# Patient Record
Sex: MALE | Race: WHITE | Employment: OTHER | URBAN - METROPOLITAN AREA
[De-identification: names, ages, dates, MRNs, and addresses within clinical notes are randomized per-mention and may not be internally consistent; named-entity substitution may affect disease eponyms.]

---

## 2018-04-11 PROBLEM — E66.01 SEVERE OBESITY (BMI 35.0-39.9) WITH COMORBIDITY (HCC): Status: ACTIVE | Noted: 2018-04-11

## 2018-05-07 ENCOUNTER — APPOINTMENT (OUTPATIENT)
Dept: GENERAL RADIOLOGY | Age: 63
DRG: 308 | End: 2018-05-07
Attending: EMERGENCY MEDICINE
Payer: COMMERCIAL

## 2018-05-07 ENCOUNTER — HOSPITAL ENCOUNTER (INPATIENT)
Age: 63
LOS: 1 days | Discharge: HOME | DRG: 308 | End: 2018-05-09
Attending: EMERGENCY MEDICINE | Admitting: HOSPITALIST
Payer: COMMERCIAL

## 2018-05-07 DIAGNOSIS — J18.9 COMMUNITY ACQUIRED PNEUMONIA OF RIGHT LUNG, UNSPECIFIED PART OF LUNG: ICD-10-CM

## 2018-05-07 DIAGNOSIS — I48.91 ATRIAL FIBRILLATION WITH RVR (HCC): Primary | ICD-10-CM

## 2018-05-07 PROBLEM — D72.829 LEUKOCYTOSIS: Status: ACTIVE | Noted: 2018-05-07

## 2018-05-07 PROBLEM — E87.6 HYPOKALEMIA: Status: ACTIVE | Noted: 2018-05-07

## 2018-05-07 LAB
ALBUMIN SERPL-MCNC: 3.2 G/DL (ref 3.2–4.6)
ALBUMIN/GLOB SERPL: 0.7 {RATIO} (ref 1.2–3.5)
ALP SERPL-CCNC: 61 U/L (ref 50–136)
ALT SERPL-CCNC: 40 U/L (ref 12–65)
ANION GAP SERPL CALC-SCNC: 12 MMOL/L (ref 7–16)
AST SERPL-CCNC: 19 U/L (ref 15–37)
BASOPHILS # BLD: 0 K/UL (ref 0–0.2)
BASOPHILS NFR BLD: 0 % (ref 0–2)
BILIRUB SERPL-MCNC: 0.6 MG/DL (ref 0.2–1.1)
BNP SERPL-MCNC: 211 PG/ML
BUN SERPL-MCNC: 10 MG/DL (ref 8–23)
CALCIUM SERPL-MCNC: 8.4 MG/DL (ref 8.3–10.4)
CHLORIDE SERPL-SCNC: 103 MMOL/L (ref 98–107)
CO2 SERPL-SCNC: 25 MMOL/L (ref 21–32)
CREAT SERPL-MCNC: 1.12 MG/DL (ref 0.8–1.5)
DIFFERENTIAL METHOD BLD: ABNORMAL
EOSINOPHIL # BLD: 0.6 K/UL (ref 0–0.8)
EOSINOPHIL NFR BLD: 4 % (ref 0.5–7.8)
ERYTHROCYTE [DISTWIDTH] IN BLOOD BY AUTOMATED COUNT: 13.5 % (ref 11.9–14.6)
GLOBULIN SER CALC-MCNC: 4.3 G/DL (ref 2.3–3.5)
GLUCOSE SERPL-MCNC: 155 MG/DL (ref 65–100)
HCT VFR BLD AUTO: 45.8 % (ref 41.1–50.3)
HGB BLD-MCNC: 15.6 G/DL (ref 13.6–17.2)
IMM GRANULOCYTES # BLD: 0.1 K/UL (ref 0–0.5)
IMM GRANULOCYTES NFR BLD AUTO: 0 % (ref 0–5)
LACTATE BLD-SCNC: 1.3 MMOL/L (ref 0.5–1.9)
LYMPHOCYTES # BLD: 2.1 K/UL (ref 0.5–4.6)
LYMPHOCYTES NFR BLD: 13 % (ref 13–44)
MAGNESIUM SERPL-MCNC: 2 MG/DL (ref 1.8–2.4)
MCH RBC QN AUTO: 30.1 PG (ref 26.1–32.9)
MCHC RBC AUTO-ENTMCNC: 34.1 G/DL (ref 31.4–35)
MCV RBC AUTO: 88.2 FL (ref 79.6–97.8)
MONOCYTES # BLD: 1.5 K/UL (ref 0.1–1.3)
MONOCYTES NFR BLD: 10 % (ref 4–12)
NEUTS SEG # BLD: 11.4 K/UL (ref 1.7–8.2)
NEUTS SEG NFR BLD: 73 % (ref 43–78)
PLATELET # BLD AUTO: 218 K/UL (ref 150–450)
PMV BLD AUTO: 10.3 FL (ref 10.8–14.1)
POTASSIUM SERPL-SCNC: 3.3 MMOL/L (ref 3.5–5.1)
PROT SERPL-MCNC: 7.5 G/DL (ref 6.3–8.2)
RBC # BLD AUTO: 5.19 M/UL (ref 4.23–5.67)
SODIUM SERPL-SCNC: 140 MMOL/L (ref 136–145)
TROPONIN I BLD-MCNC: 0.02 NG/ML (ref 0.02–0.05)
TSH SERPL DL<=0.005 MIU/L-ACNC: 2.16 UIU/ML (ref 0.36–3.74)
WBC # BLD AUTO: 15.7 K/UL (ref 4.3–11.1)

## 2018-05-07 PROCEDURE — 99285 EMERGENCY DEPT VISIT HI MDM: CPT | Performed by: EMERGENCY MEDICINE

## 2018-05-07 PROCEDURE — 83605 ASSAY OF LACTIC ACID: CPT

## 2018-05-07 PROCEDURE — 96374 THER/PROPH/DIAG INJ IV PUSH: CPT | Performed by: EMERGENCY MEDICINE

## 2018-05-07 PROCEDURE — 84484 ASSAY OF TROPONIN QUANT: CPT

## 2018-05-07 PROCEDURE — 85025 COMPLETE CBC W/AUTO DIFF WBC: CPT | Performed by: EMERGENCY MEDICINE

## 2018-05-07 PROCEDURE — 74011000250 HC RX REV CODE- 250: Performed by: EMERGENCY MEDICINE

## 2018-05-07 PROCEDURE — 74011250636 HC RX REV CODE- 250/636: Performed by: EMERGENCY MEDICINE

## 2018-05-07 PROCEDURE — 80053 COMPREHEN METABOLIC PANEL: CPT | Performed by: EMERGENCY MEDICINE

## 2018-05-07 PROCEDURE — 84145 PROCALCITONIN (PCT): CPT | Performed by: EMERGENCY MEDICINE

## 2018-05-07 PROCEDURE — 83735 ASSAY OF MAGNESIUM: CPT | Performed by: EMERGENCY MEDICINE

## 2018-05-07 PROCEDURE — 93005 ELECTROCARDIOGRAM TRACING: CPT | Performed by: EMERGENCY MEDICINE

## 2018-05-07 PROCEDURE — 83880 ASSAY OF NATRIURETIC PEPTIDE: CPT | Performed by: EMERGENCY MEDICINE

## 2018-05-07 PROCEDURE — 84443 ASSAY THYROID STIM HORMONE: CPT | Performed by: EMERGENCY MEDICINE

## 2018-05-07 PROCEDURE — 74011000258 HC RX REV CODE- 258: Performed by: EMERGENCY MEDICINE

## 2018-05-07 PROCEDURE — 71045 X-RAY EXAM CHEST 1 VIEW: CPT

## 2018-05-07 PROCEDURE — 96361 HYDRATE IV INFUSION ADD-ON: CPT | Performed by: EMERGENCY MEDICINE

## 2018-05-07 RX ORDER — POTASSIUM CHLORIDE 14.9 MG/ML
20 INJECTION INTRAVENOUS
Status: COMPLETED | OUTPATIENT
Start: 2018-05-07 | End: 2018-05-08

## 2018-05-07 RX ORDER — DILTIAZEM HYDROCHLORIDE 5 MG/ML
20 INJECTION INTRAVENOUS
Status: COMPLETED | OUTPATIENT
Start: 2018-05-07 | End: 2018-05-07

## 2018-05-07 RX ORDER — LEVOFLOXACIN 5 MG/ML
750 INJECTION, SOLUTION INTRAVENOUS
Status: COMPLETED | OUTPATIENT
Start: 2018-05-07 | End: 2018-05-08

## 2018-05-07 RX ADMIN — SODIUM CHLORIDE 1000 ML: 900 INJECTION, SOLUTION INTRAVENOUS at 22:28

## 2018-05-07 RX ADMIN — DILTIAZEM HYDROCHLORIDE 20 MG: 5 INJECTION INTRAVENOUS at 22:28

## 2018-05-07 RX ADMIN — LEVOFLOXACIN 750 MG: 5 INJECTION, SOLUTION INTRAVENOUS at 23:36

## 2018-05-07 RX ADMIN — SODIUM CHLORIDE 2.5 MG/HR: 900 INJECTION, SOLUTION INTRAVENOUS at 23:25

## 2018-05-07 NOTE — IP AVS SNAPSHOT
[address] 
[phone] Patient: Tika Galvan MRN: [id number] :1955 About your hospitalization You were admitted on: May 8, 2018 You last received care in the:  [facility] You were discharged on:  May 9, 2018 Why you were hospitalized Your primary diagnosis was:  Atrial Fibrillation With Rvr (Hcc) Your diagnoses also included:  Essential Hypertension, Benign, Mixed Hyperlipidemia, Sleep Apnea, Severe Obesity (Bmi 35.0-39. 9) With Comorbidity (Hcc), Cap (Community Acquired Pneumonia), Leukocytosis, Hypokalemia Follow-up Information Follow up With Details Comments Contact Info Sharon Champagne MD   [address] [place] [address] [address]  Avila Olsen 
[phone] Carter Garcia MD In 2 weeks APPT. MAY 23rd @ 2:[address]  [address] [facility] [address] 
[phone] Your Scheduled Appointments Wednesday May 23, 2018  2:30 PM EDT HOSPITAL FOLLOW-UP with Carter Garcia MD  
[facility] ([facility]) [address]  
[address] [facility] 81  
[phone] Discharge Orders None A check rosanna indicates which time of day the medication should be taken. My Medications START taking these medications Instructions Each Dose to Equal  
 Morning Noon Evening Bedtime  
 azithromycin 500 mg Tab Commonly known as:  [place] William Your next dose is:  TOMORROW Take 1 Tab by mouth daily for 4 days. 500 mg  
    
   
   
   
  
 dilTIAZem  mg ER capsule Commonly known as:  CARDIZEM CD Start taking on:  5/10/2018 Your next dose is:  TOMORROW Take 1 Cap by mouth daily. 240 mg  
    
   
   
   
  
 levoFLOXacin 750 mg tablet Commonly known as:  Alba Bring Your next dose is:  TODAY Take 1 Tab by mouth daily.   
 750 mg  
    
   
   
   
  
  
  
 CONTINUE taking these medications Instructions Each Dose to Equal  
 Morning Noon Evening Bedtime  
 aspirin delayed-release 81 mg tablet Your next dose is:  TOMORROW Take  by mouth daily. DAILY VITAMIN tablet Generic drug:  multivitamin Your next dose is:  TOMORROW Take 1 Tab by mouth daily. 1 Tab FLONASE 50 mcg/actuation nasal spray Generic drug:  fluticasone 2 Sprays as needed for Rhinitis. 2 Spray  
    
   
   
   
  
 lisinopril 20 mg tablet Commonly known as:  Mechele Livings Your next dose is:  TOMORROW  
   
 TAKE 1 TAB BY MOUTH DAILY. LORazepam 1 mg tablet Commonly known as:  ATIVAN Your next dose is:  TODAY Take 1 Tab by mouth nightly. Max Daily Amount: 1 mg.  
 1 mg  
    
   
   
   
  
  
 tadalafil 5 mg tablet Commonly known as:  CIALIS Take 1 Tab by mouth daily. 5 mg ZyrTEC 10 mg tablet Generic drug:  cetirizine Take 10 mg by mouth as needed for Allergies. 10 mg Where to Get Your Medications These medications were sent to [facility]/pharmacy #[id number]- 24 [facility], [address] [place] [address] 59 [address] [facility]  [address] [place] [address], [address] [place] [address] Phone:  [phone]  
  azithromycin 500 mg Tab  
 dilTIAZem  mg ER capsule  
 levoFLOXacin 750 mg tablet Discharge Instructions DISCHARGE SUMMARY from Nurse The following personal items are in your possession at time of discharge: 
 
Dental Appliances: None Visual Aid: None Home Medications: None Jewelry: None Clothing: Footwear, Pants, Shirt Other Valuables: None Personal Items Sent to Safe: none/  \"offered to pt\" / declined PATIENT INSTRUCTIONS: 
 
After general anesthesia or intravenous sedation, for 24 hours or while taking prescription Narcotics: · Limit your activities · Do not drive and operate hazardous machinery · Do not make important personal or business decisions · Do  not drink alcoholic beverages · If you have not urinated within 8 hours after discharge, please contact your surgeon on call. Report the following to your surgeon: 
· Excessive pain, swelling, redness or odor of or around the surgical area · Temperature over 100.5 · Nausea and vomiting lasting longer than 4 hours or if unable to take medications · Any signs of decreased circulation or nerve impairment to extremity: change in color, persistent  numbness, tingling, coldness or increase pain · Any questions What to do at Home: 
Recommended activity: Activity as tolerated, per MD 
 
If you experience any of the following symptoms fever>101, pain unrelieved with medication, nausea/vomiting, shortness of breath, dizziness/fainting, chest pain. , please follow up with your doctor. *  Please give a list of your current medications to your Primary Care Provider. *  Please update this list whenever your medications are discontinued, doses are 
    changed, or new medications (including over-the-counter products) are added. *  Please carry medication information at all times in case of emergency situations. These are general instructions for a healthy lifestyle: No smoking/ No tobacco products/ Avoid exposure to second hand smoke Surgeon General's Warning:  Quitting smoking now greatly reduces serious risk to your health. Obesity, smoking, and sedentary lifestyle greatly increases your risk for illness A healthy diet, regular physical exercise & weight monitoring are important for maintaining a healthy lifestyle You may be retaining fluid if you have a history of heart failure or if you experience any of the following symptoms:  Weight gain of 3 pounds or more overnight or 5 pounds in a week, increased swelling in our hands or feet or shortness of breath while lying flat in bed. Please call your doctor as soon as you notice any of these symptoms; do not wait until your next office visit. Recognize signs and symptoms of STROKE: 
 
F-face looks uneven A-arms unable to move or move unevenly S-speech slurred or non-existent T-time-call 911 as soon as signs and symptoms begin-DO NOT go Back to bed or wait to see if you get better-TIME IS BRAIN. Warning Signs of HEART ATTACK Call 911 if you have these symptoms: 
? Chest discomfort. Most heart attacks involve discomfort in the center of the chest that lasts more than a few minutes, or that goes away and comes back. It can feel like uncomfortable pressure, squeezing, fullness, or pain. ? Discomfort in other areas of the upper body. Symptoms can include pain or discomfort in one or both arms, the back, neck, jaw, or stomach. ? Shortness of breath with or without chest discomfort. ? Other signs may include breaking out in a cold sweat, nausea, or lightheadedness. Don't wait more than five minutes to call [address]! Fast action can save your life. Calling 911 is almost always the fastest way to get lifesaving treatment. Emergency Medical Services staff can begin treatment when they arrive  up to an hour sooner than if someone gets to the hospital by car. The discharge information has been reviewed with the patient. The patient verbalized understanding. Discharge medications reviewed with the patient and appropriate educational materials and side effects teaching were provided. Learning About Atrial Fibrillation What is atrial fibrillation? Atrial fibrillation (say \"AY-tree-baron cxf-awwr-VCN-shun\") is the most common type of irregular heartbeat (arrhythmia). Normally, the heart beats in a strong, steady rhythm.  In atrial fibrillation, a problem with the heart's electrical system causes the two upper parts of the heart (the atria) to quiver, or fibrillate. Your heart rate also may be faster than normal. 
Atrial fibrillation can be dangerous because if the heartbeat isn't strong and steady, blood can collect, or pool, in the atria. And pooled blood is more likely to form clots. Clots can travel to the brain, block blood flow, and cause a stroke. Atrial fibrillation can also lead to heart failure. Treatment for atrial fibrillation helps prevent stroke and heart failure. It also helps relieve symptoms. Atrial fibrillation is often caused by another heart problem. It may happen after heart surgery. It may also be caused by other problems, such as an overactive thyroid gland or lung disease. Many people with atrial fibrillation are able to live full and active lives. What are the symptoms? Some people feel symptoms when they have episodes of atrial fibrillation. But other people don't notice any symptoms. If you have symptoms, you may feel: · A fluttering, racing, or pounding feeling in your chest called palpitations. · Weak or tired. · Dizzy or lightheaded. · Short of breath. · Chest pain. · Confused. You may notice signs of atrial fibrillation when you check your pulse. Your pulse may seem uneven or fast. 
What can you expect when you have atrial fibrillation? At first, spells of atrial fibrillation may come on suddenly and last a short time. It may go away on its own or it goes away after treatment. This is called paroxysmal atrial fibrillation. Over time, the spells may last longer and occur more often. They often don't go away on their own. How is it treated? Treatments can help you feel better and prevent future problems, especially stroke and heart failure. The main types of treatment slow the heart rate, control the heart rhythm, and help prevent stroke. Your treatment will depend on the cause of your atrial fibrillation, your symptoms, and your risk for stroke. · Heart rate treatment. Medicine may be used to slow your heart rate. Your heartbeat may still be irregular. But these medicines keep your heart from beating too fast. They may also help relieve your symptoms. · Heart rhythm treatment. Different treatments may be used to try to stop atrial fibrillation and keep it from returning. They can also relieve symptoms. These treatments include medicine, electrical cardioversion to shock the heart back to a normal rhythm, a procedure called catheter ablation, and heart surgery. · Stroke prevention. You and your doctor can decide how to lower your risk. You may decide to take a blood-thinning medicine, such as aspirin or an anticoagulant. How can you live well with it? You can live well and help manage atrial fibrillation by having a heart-healthy lifestyle. To have a heart-healthy lifestyle: · Don't smoke. · Eat heart-healthy foods. · Be active. Talk to your doctor about what type and level of exercise is safe for you. · Stay at a healthy weight. Lose weight if you need to. · Manage stress. · Avoid alcohol if it triggers symptoms. · Manage other health problems such as high blood pressure, high cholesterol, and diabetes. · Avoid getting sick from the flu. Get a flu shot every year. Where can you learn more? Go to http://аннаClassteacher Learning Systemsstephanie.info/. Enter [phone] in the search box to learn more about \"Learning About Atrial Fibrillation. \" Current as of: September 21, 2016 Content Version: 11.4 © 6757-9789 [employer]. Care instructions adapted under license by [employer] (which disclaims liability or warranty for this information). If you have questions about a medical condition or this instruction, always ask your healthcare professional. [place] [address] any warranty or liability for your use of this information. [employer]) Announcement We are excited to announce that we are making your provider's discharge notes available to you in [place]. You will see these notes when they are completed and signed by the physician that discharged you from your recent hospital stay. If you have any questions or concerns about any information you see in [place], please call the Health Information Department where you were seen or reach out to your Primary Care Provider for more information about your plan of care. Introducing hospitals & HEALTH SERVICES! [facility] introduces [place] patient portal. Now you can access parts of your medical record, email your doctor's office, and request medication refills online. 1. In your internet browser, go to [email or web address] 2. Click on the First Time User? Click Here link in the Sign In box. You will see the New Member Sign Up page. 3. Enter your [place] Access Code exactly as it appears below. You will not need to use this code after youve completed the sign-up process. If you do not sign up before the expiration date, you must request a new code. · [place] Access Code: [email or web address] Expires: 8/5/2018 10:09 PM 
 
4. Enter the last four digits of your Social Security Number (xxxx) and Date of Birth (mm/dd/yyyy) as indicated and click Submit. You will be taken to the next sign-up page. 5. Create a [place] ID. This will be your [place] login ID and cannot be changed, so think of one that is secure and easy to remember. 6. Create a [place] password. You can change your password at any time. 7. Enter your Password Reset Question and Answer. This can be used at a later time if you forget your password. 8. Enter your e-mail address. You will receive e-mail notification when new information is available in [address] 9. Click Sign Up. You can now view and download portions of your medical record.  
10. Click the Download Summary menu link to download a portable copy of your medical information. If you have questions, please visit the Frequently Asked Questions section of the [employer] Corporation[employer] website. Remember, Zite is NOT to be used for urgent needs. For medical emergencies, dial 911. Now available from your iPhone and Android! Introducing Mario Espinoza As a [facility] [employer] [facility] patient, I wanted to make you aware of our electronic visit tool called Mario Espinoza. [employer] allows you to connect within minutes with a medical provider 24 hours a day, seven days a week via a mobile device or tablet or logging into a secure website from your computer. You can access Mario Espinoza from anywhere in the [place]. A virtual visit might be right for you when you have a simple condition and feel like you just dont want to get out of bed, or cant get away from work for an appointment, when your regular [facility] provider is not available (evenings, weekends or holidays), or when youre out of town and need minor care. Electronic visits cost only $49 and if the GrovesBookThatDoc/Planwise provider determines a prescription is needed to treat your condition, one can be electronically transmitted to a nearby pharmacy*. Please take a moment to enroll today if you have not already done so. The enrollment process is free and takes just a few minutes. To enroll, please download the [employer] pineda to your tablet or phone, or visit [email or web address] to enroll on your computer. And, as an [address] [place] [address] patient with a [employer] account, the results of your visits will be scanned into your electronic medical record and your primary care provider will be able to view the scanned results. We urge you to continue to see your regular [facility] provider for your ongoing medical care.   And while your primary care provider may not be the one available when you seek a Mario Espinoza virtual visit, the peace of mind you get from getting a real diagnosis real time can be priceless. For more information on Mario Jeanjose daniel, view our Frequently Asked Questions (FAQs) at [email or web address]. Sincerely, 
 
Dianah Sicard, MD 
Chief Medical Officer Magdalena Landaverde *:  certain medications cannot be prescribed via Mario Espinoza Unresulted Labs-Please follow up with your PCP about these lab tests Order Current Status CULTURE, RESPIRATORY/SPUTUM/BRONCH W GRAM STAIN Preliminary result Providers Seen During Your Hospitalization Provider Specialty Primary office phone Jh Rodriguez MD Emergency Medicine [phone] Lucho Ambrose MD Internal Medicine [phone] Your Primary Care Physician (PCP) Primary Care Physician Office Phone Office Fax Junior Rick [phone] You are allergic to the following Allergen Reactions Penicillins Unknown (comments) Recent Documentation Height Weight BMI Smoking Status 1.727 m 107.5 kg 36.04 kg/m2 Never Smoker Emergency Contacts Name Discharge Info Relation Home Work Mobile Aleyda Raines [5] [phone] Patient Belongings The following personal items are in your possession at time of discharge: 
  Dental Appliances: None  Visual Aid: None      Home Medications: None   Jewelry: None  Clothing: Footwear, Pants, Shirt    Other Valuables: None  Personal Items Sent to Safe: none/  \"offered to pt\" / declined Please provide this summary of care documentation to your next provider. Signatures-by signing, you are acknowledging that this After Visit Summary has been reviewed with you and you have received a copy. Patient Signature:  ____________________________________________________________ Date:  ____________________________________________________________  
  
Reji Ulloa  Provider Signature: ____________________________________________________________ Date:  ____________________________________________________________

## 2018-05-08 LAB
ANION GAP SERPL CALC-SCNC: 10 MMOL/L (ref 7–16)
ATRIAL RATE: 93 BPM
BUN SERPL-MCNC: 8 MG/DL (ref 8–23)
CALCIUM SERPL-MCNC: 7.7 MG/DL (ref 8.3–10.4)
CALCULATED R AXIS, ECG10: -144 DEGREES
CALCULATED T AXIS, ECG11: 56 DEGREES
CHLORIDE SERPL-SCNC: 104 MMOL/L (ref 98–107)
CO2 SERPL-SCNC: 25 MMOL/L (ref 21–32)
CREAT SERPL-MCNC: 1.05 MG/DL (ref 0.8–1.5)
CRP SERPL-MCNC: 14.9 MG/DL (ref 0–0.9)
DIAGNOSIS, 93000: NORMAL
GLUCOSE SERPL-MCNC: 142 MG/DL (ref 65–100)
MAGNESIUM SERPL-MCNC: 1.9 MG/DL (ref 1.8–2.4)
POTASSIUM SERPL-SCNC: 4 MMOL/L (ref 3.5–5.1)
PROCALCITONIN SERPL-MCNC: 0.1 NG/ML
Q-T INTERVAL, ECG07: 332 MS
QRS DURATION, ECG06: 98 MS
QTC CALCULATION (BEZET), ECG08: 515 MS
SODIUM SERPL-SCNC: 139 MMOL/L (ref 136–145)
TROPONIN I SERPL-MCNC: <0.04 NG/ML (ref 0.02–0.05)
TROPONIN I SERPL-MCNC: <0.04 NG/ML (ref 0.02–0.05)
VENTRICULAR RATE, ECG03: 145 BPM

## 2018-05-08 PROCEDURE — 84484 ASSAY OF TROPONIN QUANT: CPT | Performed by: HOSPITALIST

## 2018-05-08 PROCEDURE — 36415 COLL VENOUS BLD VENIPUNCTURE: CPT | Performed by: HOSPITALIST

## 2018-05-08 PROCEDURE — 74011250637 HC RX REV CODE- 250/637: Performed by: PHYSICIAN ASSISTANT

## 2018-05-08 PROCEDURE — 87070 CULTURE OTHR SPECIMN AEROBIC: CPT | Performed by: HOSPITALIST

## 2018-05-08 PROCEDURE — 77030013140 HC MSK NEB VYRM -A

## 2018-05-08 PROCEDURE — 74011000250 HC RX REV CODE- 250: Performed by: PHYSICIAN ASSISTANT

## 2018-05-08 PROCEDURE — 51798 US URINE CAPACITY MEASURE: CPT

## 2018-05-08 PROCEDURE — 74011250636 HC RX REV CODE- 250/636: Performed by: HOSPITALIST

## 2018-05-08 PROCEDURE — 74011000250 HC RX REV CODE- 250: Performed by: EMERGENCY MEDICINE

## 2018-05-08 PROCEDURE — 74011250636 HC RX REV CODE- 250/636: Performed by: INTERNAL MEDICINE

## 2018-05-08 PROCEDURE — 77030020120 HC VLV RESP PEP HI -B

## 2018-05-08 PROCEDURE — 80048 BASIC METABOLIC PNL TOTAL CA: CPT | Performed by: INTERNAL MEDICINE

## 2018-05-08 PROCEDURE — 74011250637 HC RX REV CODE- 250/637: Performed by: INTERNAL MEDICINE

## 2018-05-08 PROCEDURE — 77030027138 HC INCENT SPIROMETER -A

## 2018-05-08 PROCEDURE — 74011250637 HC RX REV CODE- 250/637: Performed by: HOSPITALIST

## 2018-05-08 PROCEDURE — 86140 C-REACTIVE PROTEIN: CPT | Performed by: HOSPITALIST

## 2018-05-08 PROCEDURE — 74011000258 HC RX REV CODE- 258: Performed by: INTERNAL MEDICINE

## 2018-05-08 PROCEDURE — 74011000258 HC RX REV CODE- 258: Performed by: EMERGENCY MEDICINE

## 2018-05-08 PROCEDURE — 74011000250 HC RX REV CODE- 250: Performed by: HOSPITALIST

## 2018-05-08 PROCEDURE — 94640 AIRWAY INHALATION TREATMENT: CPT

## 2018-05-08 PROCEDURE — 83735 ASSAY OF MAGNESIUM: CPT | Performed by: INTERNAL MEDICINE

## 2018-05-08 PROCEDURE — C8929 TTE W OR WO FOL WCON,DOPPLER: HCPCS

## 2018-05-08 PROCEDURE — 74011250636 HC RX REV CODE- 250/636: Performed by: EMERGENCY MEDICINE

## 2018-05-08 PROCEDURE — 65270000029 HC RM PRIVATE

## 2018-05-08 PROCEDURE — BT40ZZZ ULTRASONOGRAPHY OF BLADDER: ICD-10-PCS | Performed by: HOSPITALIST

## 2018-05-08 PROCEDURE — 94760 N-INVAS EAR/PLS OXIMETRY 1: CPT

## 2018-05-08 RX ORDER — AMOXICILLIN 250 MG
2 CAPSULE ORAL
Status: DISCONTINUED | OUTPATIENT
Start: 2018-05-08 | End: 2018-05-09 | Stop reason: HOSPADM

## 2018-05-08 RX ORDER — FLUTICASONE PROPIONATE 50 MCG
2 SPRAY, SUSPENSION (ML) NASAL DAILY
Status: DISCONTINUED | OUTPATIENT
Start: 2018-05-08 | End: 2018-05-09 | Stop reason: HOSPADM

## 2018-05-08 RX ORDER — ASPIRIN 81 MG/1
81 TABLET ORAL DAILY
Status: DISCONTINUED | OUTPATIENT
Start: 2018-05-08 | End: 2018-05-09 | Stop reason: HOSPADM

## 2018-05-08 RX ORDER — AZITHROMYCIN 250 MG/1
500 TABLET, FILM COATED ORAL DAILY
Status: DISCONTINUED | OUTPATIENT
Start: 2018-05-08 | End: 2018-05-09 | Stop reason: HOSPADM

## 2018-05-08 RX ORDER — LORAZEPAM 1 MG/1
1 TABLET ORAL
Status: DISCONTINUED | OUTPATIENT
Start: 2018-05-08 | End: 2018-05-09 | Stop reason: HOSPADM

## 2018-05-08 RX ORDER — SODIUM CHLORIDE 0.9 % (FLUSH) 0.9 %
5-10 SYRINGE (ML) INJECTION EVERY 8 HOURS
Status: DISCONTINUED | OUTPATIENT
Start: 2018-05-08 | End: 2018-05-09 | Stop reason: HOSPADM

## 2018-05-08 RX ORDER — ENOXAPARIN SODIUM 150 MG/ML
110 INJECTION SUBCUTANEOUS EVERY 12 HOURS
Status: DISCONTINUED | OUTPATIENT
Start: 2018-05-08 | End: 2018-05-08

## 2018-05-08 RX ORDER — LEVOFLOXACIN 5 MG/ML
750 INJECTION, SOLUTION INTRAVENOUS EVERY 24 HOURS
Status: DISCONTINUED | OUTPATIENT
Start: 2018-05-08 | End: 2018-05-08

## 2018-05-08 RX ORDER — SODIUM CHLORIDE 0.9 % (FLUSH) 0.9 %
5-10 SYRINGE (ML) INJECTION AS NEEDED
Status: DISCONTINUED | OUTPATIENT
Start: 2018-05-08 | End: 2018-05-09 | Stop reason: HOSPADM

## 2018-05-08 RX ORDER — GUAIFENESIN 100 MG/5ML
81 LIQUID (ML) ORAL DAILY
Status: DISCONTINUED | OUTPATIENT
Start: 2018-05-08 | End: 2018-05-08 | Stop reason: SDUPTHER

## 2018-05-08 RX ORDER — LISINOPRIL 20 MG/1
20 TABLET ORAL DAILY
Status: DISCONTINUED | OUTPATIENT
Start: 2018-05-08 | End: 2018-05-09 | Stop reason: HOSPADM

## 2018-05-08 RX ORDER — ENOXAPARIN SODIUM 100 MG/ML
40 INJECTION SUBCUTANEOUS EVERY 24 HOURS
Status: DISCONTINUED | OUTPATIENT
Start: 2018-05-09 | End: 2018-05-09 | Stop reason: HOSPADM

## 2018-05-08 RX ORDER — BISACODYL 5 MG
5 TABLET, DELAYED RELEASE (ENTERIC COATED) ORAL DAILY PRN
Status: DISCONTINUED | OUTPATIENT
Start: 2018-05-08 | End: 2018-05-09 | Stop reason: HOSPADM

## 2018-05-08 RX ORDER — NALOXONE HYDROCHLORIDE 0.4 MG/ML
0.4 INJECTION, SOLUTION INTRAMUSCULAR; INTRAVENOUS; SUBCUTANEOUS AS NEEDED
Status: DISCONTINUED | OUTPATIENT
Start: 2018-05-08 | End: 2018-05-09 | Stop reason: HOSPADM

## 2018-05-08 RX ORDER — ALBUTEROL SULFATE 0.83 MG/ML
2.5 SOLUTION RESPIRATORY (INHALATION)
Status: DISCONTINUED | OUTPATIENT
Start: 2018-05-08 | End: 2018-05-08

## 2018-05-08 RX ORDER — HYDROCODONE BITARTRATE AND ACETAMINOPHEN 5; 325 MG/1; MG/1
1 TABLET ORAL
Status: DISCONTINUED | OUTPATIENT
Start: 2018-05-08 | End: 2018-05-09 | Stop reason: HOSPADM

## 2018-05-08 RX ORDER — HYDRALAZINE HYDROCHLORIDE 20 MG/ML
20 INJECTION INTRAMUSCULAR; INTRAVENOUS
Status: DISCONTINUED | OUTPATIENT
Start: 2018-05-08 | End: 2018-05-09 | Stop reason: HOSPADM

## 2018-05-08 RX ORDER — DILTIAZEM HYDROCHLORIDE 240 MG/1
240 CAPSULE, COATED, EXTENDED RELEASE ORAL DAILY
Status: DISCONTINUED | OUTPATIENT
Start: 2018-05-08 | End: 2018-05-09 | Stop reason: HOSPADM

## 2018-05-08 RX ORDER — GUAIFENESIN/DEXTROMETHORPHAN 100-10MG/5
10 SYRUP ORAL
Status: DISCONTINUED | OUTPATIENT
Start: 2018-05-08 | End: 2018-05-09 | Stop reason: HOSPADM

## 2018-05-08 RX ORDER — LORATADINE 10 MG/1
10 TABLET ORAL DAILY
Status: DISCONTINUED | OUTPATIENT
Start: 2018-05-08 | End: 2018-05-09 | Stop reason: HOSPADM

## 2018-05-08 RX ORDER — ACETAMINOPHEN 325 MG/1
650 TABLET ORAL
Status: DISCONTINUED | OUTPATIENT
Start: 2018-05-08 | End: 2018-05-09 | Stop reason: HOSPADM

## 2018-05-08 RX ORDER — LEVALBUTEROL INHALATION SOLUTION 1.25 MG/3ML
1.25 SOLUTION RESPIRATORY (INHALATION)
Status: DISCONTINUED | OUTPATIENT
Start: 2018-05-08 | End: 2018-05-09 | Stop reason: HOSPADM

## 2018-05-08 RX ORDER — LEVALBUTEROL INHALATION SOLUTION 1.25 MG/3ML
1.25 SOLUTION RESPIRATORY (INHALATION) ONCE
Status: COMPLETED | OUTPATIENT
Start: 2018-05-08 | End: 2018-05-08

## 2018-05-08 RX ADMIN — LORAZEPAM 1 MG: 1 TABLET ORAL at 02:29

## 2018-05-08 RX ADMIN — Medication 10 ML: at 13:17

## 2018-05-08 RX ADMIN — SODIUM CHLORIDE 8.5 MG/HR: 900 INJECTION, SOLUTION INTRAVENOUS at 09:17

## 2018-05-08 RX ADMIN — DILTIAZEM HYDROCHLORIDE 240 MG: 240 CAPSULE, COATED, EXTENDED RELEASE ORAL at 13:16

## 2018-05-08 RX ADMIN — Medication 10 ML: at 06:00

## 2018-05-08 RX ADMIN — POTASSIUM CHLORIDE 20 MEQ: 200 INJECTION, SOLUTION INTRAVENOUS at 01:52

## 2018-05-08 RX ADMIN — GUAIFENESIN AND DEXTROMETHORPHAN 10 ML: 100; 10 SYRUP ORAL at 04:18

## 2018-05-08 RX ADMIN — PERFLUTREN 1 ML: 6.52 INJECTION, SUSPENSION INTRAVENOUS at 14:04

## 2018-05-08 RX ADMIN — ENOXAPARIN SODIUM 110 MG: 120 INJECTION SUBCUTANEOUS at 15:44

## 2018-05-08 RX ADMIN — LORATADINE 10 MG: 10 TABLET ORAL at 08:54

## 2018-05-08 RX ADMIN — LISINOPRIL 20 MG: 20 TABLET ORAL at 08:53

## 2018-05-08 RX ADMIN — AZITHROMYCIN 500 MG: 250 TABLET, FILM COATED ORAL at 20:23

## 2018-05-08 RX ADMIN — CEFTRIAXONE 1 G: 1 INJECTION, POWDER, FOR SOLUTION INTRAMUSCULAR; INTRAVENOUS at 20:24

## 2018-05-08 RX ADMIN — Medication 10 ML: at 20:31

## 2018-05-08 RX ADMIN — ASPIRIN 81 MG: 81 TABLET, COATED ORAL at 08:54

## 2018-05-08 RX ADMIN — MULTIPLE VITAMINS W/ MINERALS TAB 1 TABLET: TAB at 08:54

## 2018-05-08 RX ADMIN — ENOXAPARIN SODIUM 110 MG: 120 INJECTION SUBCUTANEOUS at 03:02

## 2018-05-08 RX ADMIN — Medication 10 ML: at 02:33

## 2018-05-08 RX ADMIN — Medication 10 ML: at 20:23

## 2018-05-08 RX ADMIN — LEVALBUTEROL HYDROCHLORIDE 1.25 MG: 1.25 SOLUTION RESPIRATORY (INHALATION) at 13:18

## 2018-05-08 NOTE — PROGRESS NOTES
Has not voided, states if he is positioned on side he may be able to. Assisted patient to side in attempt to void.

## 2018-05-08 NOTE — PROGRESS NOTES
Attempted to call report but receiving RN is with another pt.  [place] states she would call back when available

## 2018-05-08 NOTE — ED PROVIDER NOTES
HPI Comments: Patient presents to the ER complaining of fatigue, fevers as well as palpitations. Patient states intermittently for the past couple days he's had fevers and chills. Does report some cough which has been productive of sputum. Denies any vomiting. Reports he is also felt that his heart is racing and skipping beats. Denies any cardiac history. Patient is a 63 y.o. male presenting with palpitations. The history is provided by the patient. Palpitations    This is a new problem. The current episode started more than 2 days ago. The problem has not changed since onset. The problem occurs constantly. Associated symptoms include a fever, malaise/fatigue, irregular heartbeat and cough. Pertinent negatives include no numbness, no chest pain, no exertional chest pressure, no abdominal pain, no nausea, no vomiting, no headaches and no back pain.         Past Medical History:   Diagnosis Date    Allergic rhinitis 3/10/2015    Diverticulosis     Essential hypertension, benign 3/10/2015    Hypertrophy of prostate without urinary obstruction and other lower urinary tract symptoms (LUTS) 3/10/2015    Impotence of organic origin 3/10/2015    Insomnia     Mixed hyperlipidemia     Obesity (BMI 35.0-39.9 without comorbidity)     Sleep apnea 06/2015    CPAP-Dr. Moore [place]       Past Surgical History:   Procedure Laterality Date    HX COLONOSCOPY  05/31/2012    Hyperplastic polyp- Dr. Craig Martinez HX COLONOSCOPY  12/23/2008    colon polyps, diverticulosis and int hemorrhoids    HX COLONOSCOPY  10/18/2017    Dr. Makayla Cline- diverticulosis, int hemorrhoids    HX OTHER SURGICAL  10/1/2005    negative    HX UROLOGICAL  10/2005    negative- Dr. Jef Joyce         Family History:   Problem Relation Age of Onset    Cancer Mother     Cancer Father     Prostate Cancer Father     Cancer Brother     Prostate Cancer Brother     Cancer Other     Asthma Sister        Social History     Social History    Marital status: SINGLE     Spouse name: N/A    Number of children: N/A    Years of education: N/A     Occupational History    Not on file. Social History Main Topics    Smoking status: Never Smoker    Smokeless tobacco: Never Used    Alcohol use Yes    Drug use: No    Sexual activity: Yes     Other Topics Concern    Not on file     Social History Narrative         ALLERGIES: Penicillins    Review of Systems   Constitutional: Positive for chills, fever and malaise/fatigue. HENT: Negative for congestion. Eyes: Negative for photophobia, redness and visual disturbance. Respiratory: Positive for cough. Negative for chest tightness. Cardiovascular: Positive for palpitations. Negative for chest pain. Gastrointestinal: Negative for abdominal pain, nausea and vomiting. Endocrine: Negative for polydipsia, polyphagia and polyuria. Genitourinary: Negative for flank pain, frequency and urgency. Musculoskeletal: Negative for back pain and gait problem. Skin: Negative for pallor and rash. Allergic/Immunologic: Negative for food allergies and immunocompromised state. Neurological: Negative for numbness and headaches. Hematological: Negative for adenopathy. Does not bruise/bleed easily. Psychiatric/Behavioral: Negative for behavioral problems and confusion. All other systems reviewed and are negative. Vitals:    05/07/18 2243 05/07/18 2301 05/07/18 2311 05/07/18 2323   BP:   (!) 159/101 (!) 147/92   Pulse: (!) 107 (!) 103 (!) 110 (!) 138   Resp: 17 19 19 20   Temp:       SpO2: 96% 96% 96% 96%   Weight:       Height:                Physical Exam   Constitutional: He is oriented to person, place, and time. He appears well-developed. HENT:   Head: Normocephalic and atraumatic. Mouth/Throat: Oropharynx is clear and moist.   Eyes: Conjunctivae are normal. Pupils are equal, round, and reactive to light. No scleral icterus. Neck: Normal range of motion. Neck supple.  No tracheal deviation present. No thyromegaly present. Cardiovascular: Normal heart sounds and intact distal pulses. An irregularly irregular rhythm present. Tachycardia present. Pulmonary/Chest: Effort normal and breath sounds normal. No respiratory distress. He has no wheezes. Abdominal: Soft. Bowel sounds are normal. He exhibits no distension. Musculoskeletal: He exhibits no edema. Neurological: He is alert and oriented to person, place, and time. He has normal reflexes. Skin: Skin is warm and dry. No erythema. Nursing note and vitals reviewed. MDM  Number of Diagnoses or Management Options  Diagnosis management comments: Patient appears to be in a A. Fib with RVR  Will give IV fluids, check electrolytes as well as treat with rate slowing agent    Given her reported cough, fevers and chills, concern for concomitant pneumonia. We'll obtain chest x-ray    11:33 PM  White blood cell count is elevated at 15.7. Chemistry panel is normal.  BNP is 211. Normal magnesium  Potassium mildly decreased at 3.3. Patient with initial improvement of HR after Cardizem, however heart rate has become more elevated.     Placed on diltiazem infusion    Discussed case with hospitalist for admission           Amount and/or Complexity of Data Reviewed  Clinical lab tests: ordered and reviewed  Tests in the radiology section of CPT®: ordered and reviewed    Risk of Complications, Morbidity, and/or Mortality  Presenting problems: high  Diagnostic procedures: moderate  Management options: moderate    Patient Progress  Patient progress: improved        ED Course       Procedures

## 2018-05-08 NOTE — PROGRESS NOTES
Admitted to 371 from ED, transferred to bed with minimal difficulty. Alert and oriented X 4. Connected to cardiac monitor which depicts atrial fibrillation, heart rate 140 - 150's. Cardizem drip infusion at 7.5 mg / hour. Denies shortness of breath,and chest pain. Patient does exhibit dry non productive cough. On room air, saturation = 92 %. CHG bath done. Dual skin assessment completed with Melia Page RN. Skin is intact, no redden or broken areas noted. Call bell within reach. Educated on use of call bell, tv controll and bed controls. Instructed to call nursing for any needs.

## 2018-05-08 NOTE — PROGRESS NOTES
05/08/18 1600   Dual Skin Pressure Injury Assessment   Dual Skin Pressure Injury Assessment WDL   Second Care Provider (Based on [address] [place] [address]) Annalise Galvez RN   skin intact and dry.

## 2018-05-08 NOTE — PROGRESS NOTES
Initial visit by  to convey care and concern and encourage patient that  services are available if desired. Offered spiritual interventions during the visit, including prayer as requested. Provided business card for future reference.      Domingo Doss 70  Board Certified

## 2018-05-08 NOTE — PROGRESS NOTES
[facility] Reviewed chart and assumed care of pt. Pt awake and oriented x 4. Denies pain and SOB. Pt in NSR with rate in 90's and increases to 100's with activity. No dyspnea with exertion. See complete assessment as charted.     Lines: peripheral IV  Drips: Cardizem at 8.5 mg/hr  Drains: none

## 2018-05-08 NOTE — PROGRESS NOTES
Hospitalist Progress Note    2018  Admit Date: 2018 10:17 PM   NAME: Author Skyler   :  1955   MRN:  [id number]   Attending: Verner Berry, MD  PCP:  Erlinda Hernandes MD    SUBJECTIVE:   62 y/o gentleman with hx HTN, MALISSA, CPAP, allergic rhinitis who presents with cough and palpitations, found to be in A Fib RVR and also has CAP.    : Feels much better, converted to SR overnight on cardiazem gtt. Denies CP or SOb at this time, afebrile. Nursing notes and chart reviewed. Review of Systems negative with exception of pertinent positives noted above. PHYSICAL EXAM     Visit Vitals    /77 (BP 1 Location: Left arm, BP Patient Position: Sitting)    Pulse 87    Temp 98.9 °F (37.2 °C)    Resp 23    Ht 5' 8\" (1.727 m)    Wt 107.5 kg (237 lb)    SpO2 91%    BMI 36.04 kg/m2      Temp (24hrs), Av.9 °F (37.2 °C), Min:98.4 °F (36.9 °C), Max:100.3 °F (37.9 °C)    Oxygen Therapy  O2 Sat (%): 91 % (18 1603)  Pulse via Oximetry: 93 beats per minute (18 1318)  O2 Device: Room air (18 1318)    Intake/Output Summary (Last 24 hours) at 18 1802  Last data filed at 18 0617   Gross per 24 hour   Intake            234.4 ml   Output              925 ml   Net           -690.6 ml       General: No acute distress. Alert.    Head:  AT/NC    Lungs:  CTABL. Heart:  RRR, no murmur, rub, or gallop  Abdomen: Soft, non-distended, non-tender, +bs  Extremities: No cyanosis or clubbing. Neurologic:  No focal deficits. Moves all extremities. Skin:  No rash  Psych:  Normal mood. LABS AND STUDIES:  Personally reviewed all labs, meds, and studies for past 24hrs. ASSESSMENT      Active Hospital Problems    Diagnosis Date Noted    Atrial fibrillation with RVR ([address] 2018    CAP (community acquired pneumonia) 2018    Leukocytosis 2018    Hypokalemia 2018    Severe obesity (BMI 35.0-39. 9) with comorbidity ([facility] [address] 2018    Mixed hyperlipidemia 06/29/2015    Sleep apnea 06/29/2015    Essential hypertension, benign 03/10/2015           PLAN:    · C/w Cardiazem gtt, cards consulted for further recs regarding A Fib RVR. He doesn't need AC. · Will c/w Rocephin/Azitho, Oxygen and nebs as needed. · Monitor BP  · Replete and monitor K/Mg  · CPAP at night. Dispo: Likely DC In am if feeling better. DVT ppx:  lovenox  Discussed plan with pt who is in agreement. All questions answered.     Signed By: Xenia Solano MD     May 8, 2018

## 2018-05-08 NOTE — ROUTINE PROCESS
TRANSFER - OUT REPORT:    Verbal report given to Mary Kay Friday, RN (name) on Di Lewis  being transferred to [facility](unit) for routine progression of care       Report consisted of patients Situation, Background, Assessment and   Recommendations(SBAR). Information from the following report(s) SBAR was reviewed with the receiving nurse. Lines:   Peripheral IV 05/07/18 Right; Outer Antecubital (Active)   Site Assessment Clean, dry, & intact 5/7/2018 10:43 PM   Phlebitis Assessment 0 5/7/2018 10:43 PM   Infiltration Assessment 0 5/7/2018 10:43 PM   Dressing Status Clean, dry, & intact 5/7/2018 10:43 PM   Hub Color/Line Status Green 5/7/2018 10:43 PM       Peripheral IV 05/08/18 Left Hand (Active)   Site Assessment Clean, dry, & intact 5/8/2018 12:02 AM   Phlebitis Assessment 0 5/8/2018 12:02 AM   Infiltration Assessment 0 5/8/2018 12:02 AM   Dressing Status Clean, dry, & intact 5/8/2018 12:02 AM        Opportunity for questions and clarification was provided.       Patient transported with:   Monitor

## 2018-05-08 NOTE — PROGRESS NOTES
Care Management Interventions  PCP Verified by CM: Yes  Mode of Transport at Discharge: Other (see comment)  Transition of Care Consult (CM Consult): Other  Current Support Network: Own Home  Confirm Follow Up Transport: Family  Plan discussed with Pt/Family/Caregiver: Yes  Freedom of Choice Offered: Yes  Discharge Location  Discharge Placement: Home   Saw pt in interdisciplinarily rounds, plan of care and discharge date/ location discussed. Per the hospitalitis plan is to d/c pt in 24-48hrs to home. Pt works from home, drives and is indep with all ADL's . Has no needs at this time.

## 2018-05-08 NOTE — CONSULTS
[facility] Cardiology Consult                Date of  Admission: 5/7/2018 10:17 PM     Primary Care Physician: Dr. Misbah Farrell  Primary Cardiologist: none  Referring Physician: Dr MADDISON Morrell  Consulting Physician: Dr. Tyra Norris    CC/Reason for consult: a-fib RVR      Jamal Pereira is a 63 y.o. male who presented with fatigue, fever, chills, palpitations, and cough. He was in atrial fibrillation on arrival and was noted to have infiltrate on chest xray. He was admitted and started on IV antibiotics. He has converted to sinus rhythm. He has no prior history of atrial arrhthymias but states he has noted palpitations in the past but this was the first sustained episode. Echo is pending. PMH includes: HTN, MALISSA        Patient Active Problem List   Diagnosis Code    Allergic rhinitis J30.9    Essential hypertension, benign I10    Abnormal blood sugar R73.09    Mixed hyperlipidemia E78.2    Sleep apnea G47.30    Obesity (BMI 35.0-39.9 without comorbidity) E66.9    Insomnia G47.00    Erectile dysfunction N52.9    Elevated PSA R97.20    Severe obesity (BMI 35.0-39. 9) with comorbidity (HCC) E66.01    Atrial fibrillation with RVR ([facility]) I48.91    CAP (community acquired pneumonia) J18.9    Leukocytosis D72.829    Hypokalemia E87.6       Past Medical History:   Diagnosis Date    Allergic rhinitis 3/10/2015    Diverticulosis     Essential hypertension, benign 3/10/2015    Hypertrophy of prostate without urinary obstruction and other lower urinary tract symptoms (LUTS) 3/10/2015    Impotence of organic origin 3/10/2015    Insomnia     Mixed hyperlipidemia     Obesity (BMI 35.0-39.9 without comorbidity)     Sleep apnea 06/2015    CPAP-Dr. Ryan Alcantara      Past Surgical History:   Procedure Laterality Date    HX COLONOSCOPY  05/31/2012    Hyperplastic polyp- Dr. Ang Spence HX COLONOSCOPY  12/23/2008    colon polyps, diverticulosis and int hemorrhoids    HX COLONOSCOPY  10/18/2017    Dr. Betsy Green- diverticulosis, int hemorrhoids    HX OTHER SURGICAL  10/1/2005    negative    HX UROLOGICAL  10/2005    negative- Dr. Endy Shahid     Allergies   Allergen Reactions    Penicillins Unknown (comments)      Family History   Problem Relation Age of Onset    Cancer Mother     Cancer Father     Prostate Cancer Father     Cancer Brother     Prostate Cancer Brother     Cancer Other     Asthma Sister         Current Facility-Administered Medications   Medication Dose Route Frequency    sodium chloride (NS) flush 5-10 mL  5-10 mL IntraVENous Q8H    sodium chloride (NS) flush 5-10 mL  5-10 mL IntraVENous PRN    sodium chloride (NS) flush 5-10 mL  5-10 mL IntraVENous Q8H    sodium chloride (NS) flush 5-10 mL  5-10 mL IntraVENous PRN    levoFLOXacin (LEVAQUIN) 750 mg in D5W IVPB  750 mg IntraVENous Q24H    acetaminophen (TYLENOL) tablet 650 mg  650 mg Oral Q4H PRN    naloxone (NARCAN) injection 0.4 mg  0.4 mg IntraVENous PRN    enoxaparin (LOVENOX) injection 110 mg  110 mg SubCUTAneous Q12H    fluticasone (FLONASE) 50 mcg/actuation nasal spray 2 Spray  2 Spray Both Nostrils DAILY    loratadine (CLARITIN) tablet 10 mg  10 mg Oral DAILY    multivitamin, tx-iron-ca-min (THERA-M w/ IRON) tablet 1 Tab  1 Tab Oral DAILY    aspirin delayed-release tablet 81 mg  81 mg Oral DAILY    LORazepam (ATIVAN) tablet 1 mg  1 mg Oral QHS    lisinopril (PRINIVIL, ZESTRIL) tablet 20 mg  20 mg Oral DAILY    guaiFENesin-dextromethorphan (ROBITUSSIN DM) 100-10 mg/5 mL syrup 10 mL  10 mL Oral Q6H PRN    levalbuterol (XOPENEX) nebulizer soln 1.25 mg/3 mL  1.25 mg Nebulization ONCE    dilTIAZem CD (CARDIZEM CD) capsule 240 mg  240 mg Oral DAILY       Review of Systems   Constitution: Positive for malaise/fatigue. Negative for chills, fever, weakness, weight gain and weight loss. HENT: Negative for ear pain, hearing loss, nosebleeds, sore throat and tinnitus.     Eyes: Negative for blurred vision, vision loss in left eye and vision loss in right eye. Cardiovascular: Positive for palpitations. Negative for chest pain, dyspnea on exertion, leg swelling, near-syncope, orthopnea, paroxysmal nocturnal dyspnea and syncope. Respiratory: Positive for cough, shortness of breath and sputum production. Negative for hemoptysis and wheezing. Endocrine: Negative for cold intolerance, heat intolerance and polydipsia. Hematologic/Lymphatic: Does not bruise/bleed easily. Skin: Negative for color change and rash. Musculoskeletal: Negative for back pain, joint pain, joint swelling and myalgias. Gastrointestinal: Negative for abdominal pain, constipation, diarrhea, dysphagia, heartburn, hematemesis, melena, nausea and vomiting. Genitourinary: Negative for dysuria, frequency, hematuria and urgency. Neurological: Negative for difficulty with concentration, dizziness, headaches, light-headedness, numbness, paresthesias, seizures and vertigo. Psychiatric/Behavioral: Negative for altered mental status and depression.           Physical Exam  Vitals:    05/08/18 0820 05/08/18 0900 05/08/18 1000 05/08/18 1100   BP:  127/73 117/74 125/79   Pulse:  97 85 85   Resp:  20 21 20   Temp:       SpO2: 91% 92% 93% 93%   Weight:       Height:           Physical Exam:  General: Well Developed, Well Nourished, No Acute Distress  HEENT: pupils equal and round, no abnormalities noted  Neck: supple, no JVD  Heart: S1S2 with RRR  Lungs: wheezing bilaterally  Abd: soft, nontender, nondistended, with good bowel sounds  Ext: warm, no edema, calves supple/nontender, pulses 2+ bilaterally  Skin: warm and dry  Psychiatric: Normal mood and affect  Neurologic: Alert and oriented X 3      Cardiographics    Telemetry: sinus rhythm   ECG: a-fib with RVR  Echocardiogram: pending     Labs:   Recent Labs      05/08/18   0524  05/07/18 2228  05/07/18   2226   NA  139   --   140   K  4.0   --   3.3*   MG  1.9   --   2.0   BUN  8   --   10   CREA  1.05   --   1.12   GLU  142*   -- 155*   WBC   --    --   15.7*   HGB   --    --   15.6   HCT   --    --   45.8   PLT   --    --   218   TNIPOC   --   0.02   --    TROIQ  <0.04   --    --      All Cardiac Markers in the last 24 hours:    Lab Results   Component Value Date/Time    TROIQ <0.04 05/08/2018 05:24 AM    TNIPOC 0.02 05/07/2018 10:28 PM         Assessment/Plan:      Principal Problem:     CAP (community acquired pneumonia) (5/7/2018)  On IV antibiotics, wheezing on exam, will give neb treatment ([place] Rod), would avoid albuterol given a-fib RVR on admission      Atrial fibrillation with RVR ([address] (5/7/2018)  Back in sinus rhythm, Echo pending, Chads vasc score of 1, on Lovenox, will change to oral Cardizem, will review echo    Active Problems:    Essential hypertension, benign (3/10/2015)  BP stable on CCB and Lisinopril      Mixed hyperlipidemia (6/29/2015)        Sleep apnea (6/29/2015)  cpap      Severe obesity (BMI 35.0-39. 9) with comorbidity ([address] (4/11/2018)         Leukocytosis (5/7/2018)  Due to CAP      Hypokalemia (5/7/2018)  Resolved         Thank you very much for this referral. We appreciate the opportunity to participate in this patient's care. We will follow along with above stated plan. Lemuel Busby PA-C  Consulting MD: Dr. Lesly Talavera    Patient seen and examined. Agree with above note. He has PAF which converted to SR in setting of  Pneumonia. CHADSVASC2 score 1. Echo pending. No hx of DM or CAD. Currently on po Cardizem. Will continue it for now and F/U in the office in 2 weeks. No need for antiarrhythmic therapy or oral anticoagulation at this time. Will review need for additional meds as OP. Thank you for the consult. Will be on standby.       Michelle Glover MD

## 2018-05-08 NOTE — H&P
Hospitalist H&P/Consult Note     Admit Date:  2018 10:17 PM   Name:  Cherrie Regalado   Age:  63 y.o.  :  1955   MRN:  [id number]   PCP:  Marilu Alvarez MD  Treatment Team: Attending Provider: Shankar Stallworth MD    HPI:   Mr. Dunia Marie is a pleasant 64 y/o gentleman with hx HTN, MALISSA, CPAP, allergic rhinitis who presents with cough and palpitations. He states last Tuesday he saw ENT for aleergy type symptoms and received a kenalog injection. Felt he may have had a \"flu-like illness\" at the time. Since then he has had some progressive dry cough which now has yellow sputum. Some sweats and subjective fever. No chills or confusion. He denies chest pain. Today felt his heart racing and skipping beats. Came to ED and is found to have rapid atrial fibrillation which is new for him. No hx MI. No hx DVT o, PE or thyroid disease. Chest xray shows right perihilar infiltrate and he has a WBC ct of 15.7. Started on Cardizem GTT for rate control and levaquin for CAP. Hospitalist service is consulted for admission. 10 systems reviewed and negative except as noted in HPI.   Past Medical History:   Diagnosis Date    Allergic rhinitis 3/10/2015    Diverticulosis     Essential hypertension, benign 3/10/2015    Hypertrophy of prostate without urinary obstruction and other lower urinary tract symptoms (LUTS) 3/10/2015    Impotence of organic origin 3/10/2015    Insomnia     Mixed hyperlipidemia     Obesity (BMI 35.0-39.9 without comorbidity)     Sleep apnea 2015    CPAP-Dr. Zita Sadler      Past Surgical History:   Procedure Laterality Date    HX COLONOSCOPY  2012    Hyperplastic polyp- Dr. Maricarmen Ledezma HX COLONOSCOPY  2008    colon polyps, diverticulosis and int hemorrhoids    HX COLONOSCOPY  10/18/2017    Dr. Kaden John- diverticulosis, int hemorrhoids    HX OTHER SURGICAL  10/1/2005    negative    HX UROLOGICAL  10/2005    negative- Dr. Mabel Vnies      Prior to Admission Medications Prescriptions Last Dose Informant Patient Reported? Taking? LORazepam (ATIVAN) 1 mg tablet 2018  No No   Sig: Take 1 Tab by mouth nightly. Max Daily Amount: 1 mg.   aspirin delayed-release 81 mg tablet 2018 at 0800  Yes No   Sig: Take  by mouth daily. cetirizine (ZYRTEC) 10 mg tablet Unknown at Unknown time  Yes No   Sig: Take 10 mg by mouth as needed for Allergies. fluticasone (FLONASE) 50 mcg/actuation nasal spray 2018 at 1700  Yes No   Si Sprays as needed for Rhinitis. lisinopril (PRINIVIL, ZESTRIL) 20 mg tablet 2018 at 0800  No No   Sig: TAKE 1 TAB BY MOUTH DAILY. multivitamin (DAILY VITAMIN) tablet Unknown at Unknown time  Yes No   Sig: Take 1 Tab by mouth daily. tadalafil (CIALIS) 5 mg tablet 2018 at Unknown time  No Yes   Sig: Take 1 Tab by mouth daily.       Facility-Administered Medications: None     Allergies   Allergen Reactions    Penicillins Unknown (comments)      Social History   Substance Use Topics    Smoking status: Never Smoker    Smokeless tobacco: Never Used    Alcohol use Yes      Family History   Problem Relation Age of Onset    Cancer Mother     Cancer Father     Prostate Cancer Father     Cancer Brother     Prostate Cancer Brother     Cancer Other     Asthma Sister       Immunization History   Administered Date(s) Administered    Influenza Vaccine 10/24/2012, 10/30/2013    Influenza Vaccine PF 10/16/2015    Td 09/15/2005       Objective:   Patient Vitals for the past 24 hrs:   Temp Pulse Resp BP SpO2   18 0157 - 86 21 119/78 94 %   18 0152 - (!) 137 20 130/84 92 %   18 0147 98.4 °F (36.9 °C) (!) 133 18 156/89 92 %   18 0112 - (!) 129 18 143/81 96 %   18 0102 - (!) 120 19 - 96 %   18 0042 - (!) 113 19 121/90 96 %   18 0003 - (!) 130 20 - 96 %   18 2357 - (!) 130 20 - 96 %   18 2342 - (!) 116 25 (!) 147/95 96 %   18 2323 - (!) 138 20 (!) 147/92 96 %   18 2311 - (!) 110 19 (!) 159/101 96 %   05/07/18 2301 - (!) 103 19 - 96 %   05/07/18 2243 - (!) 107 17 - 96 %   05/07/18 2236 - (!) 125 23 - 97 %   05/07/18 2235 - (!) 128 17 - 93 %   05/07/18 2233 - (!) 110 16 - 92 %   05/07/18 2229 - (!) 145 22 - 94 %   05/07/18 2224 - (!) 155 18 - 95 %   05/07/18 2223 - (!) 147 16 (!) 151/97 90 %   05/07/18 2209 98.4 °F (36.9 °C) (!) 133 18 (!) 138/97 95 %     Oxygen Therapy  O2 Sat (%): 94 % (05/08/18 0157)  Pulse via Oximetry: 87 beats per minute (05/08/18 0157)  O2 Device: Room air (05/07/18 2209)  No intake or output data in the 24 hours ending 05/08/18 0217    Physical Exam:  General:    Well nourished. Alert. Warm, sweaty   Eyes:   Normal sclera. Extraocular movements intact. ENT:  Normocephalic, atraumatic. Moist mucous membranes  CV:   Irregularly, irregular  Lungs:  Bilateral wheeze, rales on the right  Abdomen: Soft, nontender, nondistended. Bowel sounds normal.   Extremities: Warm and dry. No cyanosis or edema. Distal pulses palpable, no mottling, no calf tenderness  Neurologic: CN II-XII grossly intact. Sensation intact. Skin:     No rashes or jaundice. No wounds. Psych:  Normal mood and affect. I reviewed the labs, imaging, EKGs, telemetry, and other studies done this admission.   Data Review:   Recent Results (from the past 24 hour(s))   EKG, 12 LEAD, INITIAL    Collection Time: 05/07/18 10:14 PM   Result Value Ref Range    Ventricular Rate 145 BPM    Atrial Rate 93 BPM    QRS Duration 98 ms    Q-T Interval 332 ms    QTC Calculation (Bezet) 515 ms    Calculated R Axis -144 degrees    Calculated T Axis 56 degrees    Diagnosis       Atrial fibrillation with rapid ventricular response with premature   ventricular or aberrantly conducted complexes  Right superior axis deviation  Incomplete right bundle branch block  Anterior infarct , age undetermined  Abnormal ECG  No previous ECGs available     CBC WITH AUTOMATED DIFF    Collection Time: 05/07/18 10:26 PM   Result Value Ref Range    WBC 15.7 (H) 4.3 - 11.1 K/uL    RBC 5.19 4.23 - 5.67 M/uL    HGB 15.6 13.6 - 17.2 g/dL    HCT 45.8 41.1 - 50.3 %    MCV 88.2 79.6 - 97.8 FL    MCH 30.1 26.1 - 32.9 PG    MCHC 34.1 31.4 - 35.0 g/dL    RDW 13.5 11.9 - 14.6 %    PLATELET 491 260 - 912 K/uL    MPV 10.3 (L) 10.8 - 14.1 FL    DF AUTOMATED      NEUTROPHILS 73 43 - 78 %    LYMPHOCYTES 13 13 - 44 %    MONOCYTES 10 4.0 - 12.0 %    EOSINOPHILS 4 0.5 - 7.8 %    BASOPHILS 0 0.0 - 2.0 %    IMMATURE GRANULOCYTES 0 0.0 - 5.0 %    ABS. NEUTROPHILS 11.4 (H) 1.7 - 8.2 K/UL    ABS. LYMPHOCYTES 2.1 0.5 - 4.6 K/UL    ABS. MONOCYTES 1.5 (H) 0.1 - 1.3 K/UL    ABS. EOSINOPHILS 0.6 0.0 - 0.8 K/UL    ABS. BASOPHILS 0.0 0.0 - 0.2 K/UL    ABS. IMM. GRANS. 0.1 0.0 - 0.5 K/UL   METABOLIC PANEL, COMPREHENSIVE    Collection Time: 05/07/18 10:26 PM   Result Value Ref Range    Sodium 140 136 - 145 mmol/L    Potassium 3.3 (L) 3.5 - 5.1 mmol/L    Chloride 103 98 - 107 mmol/L    CO2 25 21 - 32 mmol/L    Anion gap 12 7 - 16 mmol/L    Glucose 155 (H) 65 - 100 mg/dL    BUN 10 8 - 23 MG/DL    Creatinine 1.12 0.8 - 1.5 MG/DL    GFR est AA >60 >60 ml/min/1.73m2    GFR est non-AA >60 >60 ml/min/1.73m2    Calcium 8.4 8.3 - 10.4 MG/DL    Bilirubin, total 0.6 0.2 - 1.1 MG/DL    ALT (SGPT) 40 12 - 65 U/L    AST (SGOT) 19 15 - 37 U/L    Alk.  phosphatase 61 50 - 136 U/L    Protein, total 7.5 6.3 - 8.2 g/dL    Albumin 3.2 3.2 - 4.6 g/dL    Globulin 4.3 (H) 2.3 - 3.5 g/dL    A-G Ratio 0.7 (L) 1.2 - 3.5     BNP    Collection Time: 05/07/18 10:26 PM   Result Value Ref Range     pg/mL   MAGNESIUM    Collection Time: 05/07/18 10:26 PM   Result Value Ref Range    Magnesium 2.0 1.8 - 2.4 mg/dL   TSH 3RD GENERATION    Collection Time: 05/07/18 10:26 PM   Result Value Ref Range    TSH 2.156 0.358 - 3.740 uIU/mL   PROCALCITONIN    Collection Time: 05/07/18 10:26 PM   Result Value Ref Range    Procalcitonin 0.1 ng/mL   POC TROPONIN-I    Collection Time: 05/07/18 10:28 PM   Result Value Ref Range Troponin-I (POC) 0.02 0.02 - 0.05 ng/ml   POC LACTIC ACID    Collection Time: 05/07/18 10:30 PM   Result Value Ref Range    Lactic Acid (POC) 1.3 0.5 - 1.9 mmol/L       Imaging Studies:  CXR Results  (Last 48 hours)               05/07/18 2236  XR CHEST PORT Final result    Impression:  IMPRESSION: Right perihilar density, likely infiltrate           Narrative:  Chest X-ray       INDICATION:   Shortness of breath, tachycardia       A portable AP view of the chest was obtained. FINDINGS: There is minimal right perihilar infiltrate. Left lung is clear. There is mild prominence of the heart. The bony thorax is intact. CT Results  (Last 48 hours)    None          Assessment and Plan:     Hospital Problems as of 5/8/2018  Date Reviewed: 4/11/2018          Codes Class Noted - Resolved POA    * (Principal)Atrial fibrillation with RVR (HCC) ICD-10-CM: I48.91  ICD-9-CM: 427.31  5/7/2018 - Present Yes        CAP (community acquired pneumonia) ICD-10-CM: J18.9  ICD-9-CM: 741  5/7/2018 - Present Yes        Leukocytosis ICD-10-CM: B23.131  ICD-9-CM: 288.60  5/7/2018 - Present Yes        Hypokalemia ICD-10-CM: E87.6  ICD-9-CM: 276.8  5/7/2018 - Present Yes        Severe obesity (BMI 35.0-39. 9) with comorbidity ([facility] [address] ICD-10-CM: E66.01  ICD-9-CM: 278.01  4/11/2018 - Present Yes        Mixed hyperlipidemia ICD-10-CM: E78.2  ICD-9-CM: 272.2  6/29/2015 - Present Yes        Sleep apnea ICD-10-CM: G47.30  ICD-9-CM: 780.57  6/29/2015 - Present Yes        Essential hypertension, benign ICD-10-CM: I10  ICD-9-CM: 401.1  3/10/2015 - Present Yes              PLAN:  · Admit to telemetry, bedrest tonight  · Continue IV cardizem infusion for BP and rate control  · Anticoagulate with weight-based lovenox 1 mg/kg SQ Q 12 hours  · Serial troponin to r/o ischemia/ACS  · Keep potassium above 4, Mg above 2. TSH is normal  · Continue asa and lipid control  · Continue lovenox for pneumonia. · Mucolytic.  Send sputum for culture  · Echocardiogram in am  · If patient does not spontaneously convert to NSR consult Cardiology in am  · Prn respiratory treatments      Estimated LOS:  Greater than 2 midnights    Signed:  Leonie Rey MD

## 2018-05-08 NOTE — PROGRESS NOTES
Problem: Falls - Risk of  Goal: *Absence of Falls  Document Akash Fall Risk and appropriate interventions in the flowsheet.    Outcome: Progressing Towards Goal  Fall Risk Interventions:  Mobility Interventions: Patient to call before getting OOB         Medication Interventions: Patient to call before getting OOB

## 2018-05-08 NOTE — PROGRESS NOTES
Report to José Aquino RN.  Patient awakens to verbal. Cardizem drip remains at 8.5 mg/hr, heart rate 95/ NSR.

## 2018-05-08 NOTE — PROGRESS NOTES
TRANSFER - OUT REPORT:    Verbal report given to GABE Ramirez(name) on Wm. Patrizia Fink  being transferred to [facility](unit) for routine progression of care       Report consisted of patients Situation, Background, Assessment and   Recommendations(SBAR). Information from the following report(s) SBAR, Kardex, Intake/Output, MAR, Recent Results and Cardiac Rhythm NSR was reviewed with the receiving nurse. Lines:   Peripheral IV 05/07/18 Right; Outer Antecubital (Active)   Site Assessment Clean, dry, & intact 5/8/2018  7:00 AM   Phlebitis Assessment 0 5/8/2018  7:00 AM   Infiltration Assessment 0 5/8/2018  7:00 AM   Dressing Status Clean, dry, & intact 5/8/2018  7:00 AM   Dressing Type Transparent 5/8/2018  7:00 AM   Hub Color/Line Status Infusing 5/8/2018  7:00 AM   Alcohol Cap Used No 5/8/2018  1:47 AM       Peripheral IV 05/08/18 Left Hand (Active)   Site Assessment Clean, dry, & intact 5/8/2018  7:00 AM   Phlebitis Assessment 0 5/8/2018  7:00 AM   Infiltration Assessment 0 5/8/2018  7:00 AM   Dressing Status Clean, dry, & intact 5/8/2018  7:00 AM   Dressing Type Transparent;Tape 5/8/2018  7:00 AM   Hub Color/Line Status Capped 5/8/2018  7:00 AM   Alcohol Cap Used No 5/8/2018  1:47 AM        Opportunity for questions and clarification was provided.

## 2018-05-08 NOTE — PROGRESS NOTES
TRANSFER - IN REPORT:    Verbal report received from Luis Felipe Barahona on [employer]  being received from ED for routine progression of care      Report consisted of patients Situation, Background, Assessment and   Recommendations(SBAR). Information from the following report(s) SBAR, ED Summary, [facility] and Cardiac Rhythm atrial fibrillation was reviewed with the receiving nurse. Opportunity for questions and clarification was provided. Assessment completed upon patients arrival to unit and care assumed.

## 2018-05-08 NOTE — PROGRESS NOTES
Shift assessment complete. Respirations present. Even and unlabored. No s/s of distress. Zero c/o pain at this time. Call light within reach. Encouraged patient to call for assistance. Patient verbalizes understanding. See Doc Flowsheet for assessment details. Patient resting in bed. Received from ICU . Some congestion heard when coughing. Denies any pain , or shortness of breath. Ambulating to bathroom to void at intervals. Skin intact, checked with Hernando Parekh RN. Room air noted. Bowel movement yesterday.

## 2018-05-09 VITALS
HEART RATE: 75 BPM | HEIGHT: 68 IN | BODY MASS INDEX: 35.92 KG/M2 | OXYGEN SATURATION: 94 % | RESPIRATION RATE: 18 BRPM | DIASTOLIC BLOOD PRESSURE: 76 MMHG | WEIGHT: 237 LBS | TEMPERATURE: 98.2 F | SYSTOLIC BLOOD PRESSURE: 119 MMHG

## 2018-05-09 LAB
ALBUMIN SERPL-MCNC: 2.5 G/DL (ref 3.2–4.6)
ALBUMIN/GLOB SERPL: 0.7 {RATIO} (ref 1.2–3.5)
ALP SERPL-CCNC: 43 U/L (ref 50–136)
ALT SERPL-CCNC: 41 U/L (ref 12–65)
ANION GAP SERPL CALC-SCNC: 8 MMOL/L (ref 7–16)
AST SERPL-CCNC: 18 U/L (ref 15–37)
BASOPHILS # BLD: 0.1 K/UL (ref 0–0.2)
BASOPHILS NFR BLD: 1 % (ref 0–2)
BILIRUB SERPL-MCNC: 0.3 MG/DL (ref 0.2–1.1)
BUN SERPL-MCNC: 8 MG/DL (ref 8–23)
CALCIUM SERPL-MCNC: 8 MG/DL (ref 8.3–10.4)
CHLORIDE SERPL-SCNC: 106 MMOL/L (ref 98–107)
CO2 SERPL-SCNC: 26 MMOL/L (ref 21–32)
CREAT SERPL-MCNC: 0.91 MG/DL (ref 0.8–1.5)
DIFFERENTIAL METHOD BLD: ABNORMAL
EOSINOPHIL # BLD: 0.6 K/UL (ref 0–0.8)
EOSINOPHIL NFR BLD: 8 % (ref 0.5–7.8)
ERYTHROCYTE [DISTWIDTH] IN BLOOD BY AUTOMATED COUNT: 13.7 % (ref 11.9–14.6)
GLOBULIN SER CALC-MCNC: 3.8 G/DL (ref 2.3–3.5)
GLUCOSE SERPL-MCNC: 113 MG/DL (ref 65–100)
HCT VFR BLD AUTO: 39.9 % (ref 41.1–50.3)
HGB BLD-MCNC: 13.3 G/DL (ref 13.6–17.2)
IMM GRANULOCYTES # BLD: 0.1 K/UL (ref 0–0.5)
IMM GRANULOCYTES NFR BLD AUTO: 2 % (ref 0–5)
LYMPHOCYTES # BLD: 1.9 K/UL (ref 0.5–4.6)
LYMPHOCYTES NFR BLD: 22 % (ref 13–44)
MCH RBC QN AUTO: 29.4 PG (ref 26.1–32.9)
MCHC RBC AUTO-ENTMCNC: 33.3 G/DL (ref 31.4–35)
MCV RBC AUTO: 88.3 FL (ref 79.6–97.8)
MONOCYTES # BLD: 1.1 K/UL (ref 0.1–1.3)
MONOCYTES NFR BLD: 13 % (ref 4–12)
NEUTS SEG # BLD: 4.7 K/UL (ref 1.7–8.2)
NEUTS SEG NFR BLD: 54 % (ref 43–78)
PLATELET # BLD AUTO: 232 K/UL (ref 150–450)
PMV BLD AUTO: 9.6 FL (ref 10.8–14.1)
POTASSIUM SERPL-SCNC: 3.9 MMOL/L (ref 3.5–5.1)
PROT SERPL-MCNC: 6.3 G/DL (ref 6.3–8.2)
RBC # BLD AUTO: 4.52 M/UL (ref 4.23–5.67)
SODIUM SERPL-SCNC: 140 MMOL/L (ref 136–145)
WBC # BLD AUTO: 8.6 K/UL (ref 4.3–11.1)

## 2018-05-09 PROCEDURE — 74011250637 HC RX REV CODE- 250/637: Performed by: PHYSICIAN ASSISTANT

## 2018-05-09 PROCEDURE — 74011250637 HC RX REV CODE- 250/637: Performed by: HOSPITALIST

## 2018-05-09 PROCEDURE — 85025 COMPLETE CBC W/AUTO DIFF WBC: CPT | Performed by: HOSPITALIST

## 2018-05-09 PROCEDURE — 80053 COMPREHEN METABOLIC PANEL: CPT | Performed by: HOSPITALIST

## 2018-05-09 PROCEDURE — 74011250636 HC RX REV CODE- 250/636: Performed by: INTERNAL MEDICINE

## 2018-05-09 PROCEDURE — 94760 N-INVAS EAR/PLS OXIMETRY 1: CPT

## 2018-05-09 PROCEDURE — 74011250637 HC RX REV CODE- 250/637: Performed by: INTERNAL MEDICINE

## 2018-05-09 PROCEDURE — 36415 COLL VENOUS BLD VENIPUNCTURE: CPT | Performed by: HOSPITALIST

## 2018-05-09 PROCEDURE — 77010033678 HC OXYGEN DAILY

## 2018-05-09 RX ORDER — LEVOFLOXACIN 750 MG/1
750 TABLET ORAL DAILY
Qty: 5 TAB | Refills: 0 | Status: SHIPPED | OUTPATIENT
Start: 2018-05-09 | End: 2018-05-14

## 2018-05-09 RX ORDER — DILTIAZEM HYDROCHLORIDE 240 MG/1
240 CAPSULE, COATED, EXTENDED RELEASE ORAL DAILY
Qty: 30 CAP | Refills: 0 | Status: SHIPPED | OUTPATIENT
Start: 2018-05-10 | End: 2018-06-07 | Stop reason: SDUPTHER

## 2018-05-09 RX ORDER — AZITHROMYCIN 500 MG/1
500 TABLET, FILM COATED ORAL DAILY
Qty: 4 TAB | Refills: 0 | Status: SHIPPED | OUTPATIENT
Start: 2018-05-09 | End: 2018-05-13

## 2018-05-09 RX ADMIN — FLUTICASONE PROPIONATE 2 SPRAY: 50 SPRAY, METERED NASAL at 09:44

## 2018-05-09 RX ADMIN — MULTIPLE VITAMINS W/ MINERALS TAB 1 TABLET: TAB at 09:37

## 2018-05-09 RX ADMIN — ENOXAPARIN SODIUM 40 MG: 40 INJECTION SUBCUTANEOUS at 09:37

## 2018-05-09 RX ADMIN — LISINOPRIL 20 MG: 20 TABLET ORAL at 09:36

## 2018-05-09 RX ADMIN — Medication 10 ML: at 06:00

## 2018-05-09 RX ADMIN — DILTIAZEM HYDROCHLORIDE 240 MG: 240 CAPSULE, COATED, EXTENDED RELEASE ORAL at 09:37

## 2018-05-09 RX ADMIN — ASPIRIN 81 MG: 81 TABLET, COATED ORAL at 09:37

## 2018-05-09 RX ADMIN — AZITHROMYCIN 500 MG: 250 TABLET, FILM COATED ORAL at 09:36

## 2018-05-09 NOTE — DISCHARGE SUMMARY
Hospitalist Discharge Summary     Patient ID:  Kim Whitmore  [id number]  15 y.o.  1955  Admit date: 5/7/2018 10:17 PM  Discharge date and time: 5/9/2018  Attending: Flores Diaz MD  PCP:  Silva Anderson MD  Treatment Team: Attending Provider: Flores Diaz MD; Utilization Review: Janette Pan RN    Principal Diagnosis Atrial fibrillation with RVR [facility])   Principal Problem:    Atrial fibrillation with RVR ([facility] [address] (5/7/2018)    Active Problems:    Essential hypertension, benign (3/10/2015)      Mixed hyperlipidemia (6/29/2015)      Sleep apnea (6/29/2015)      Severe obesity (BMI 35.0-39. 9) with comorbidity ([facility] [address] (4/11/2018)      CAP (community acquired pneumonia) (5/7/2018)      Leukocytosis (5/7/2018)      Hypokalemia (5/7/2018)       HPI:    Mr. Tova Loya is a pleasant 64 y/o gentleman with hx HTN, MALISSA, CPAP, allergic rhinitis who presents with cough and palpitations. He states last Tuesday he saw ENT for aleergy type symptoms and received a kenalog injection. Felt he may have had a \"flu-like illness\" at the time. Since then he has had some progressive dry cough which now has yellow sputum. Some sweats and subjective fever. No chills or confusion. He denies chest pain. Today felt his heart racing and skipping beats. Came to ED and is found to have rapid atrial fibrillation which is new for him. No hx MI. No hx DVT o, PE or thyroid disease. Chest xray shows right perihilar infiltrate and he has a WBC ct of 15.7. Started on Cardizem GTT for rate control and levaquin for CAP. Hospitalist service is consulted for admission. Hospital Course:    Pt was starte don cardiazem gtt and seen by cards, converted to SR and switched to PO cardiazem per cards. TTE showed Nl EF. He remaiedn in SR and overall symptoms improved. He was weaned off of IOxygen and is feeling better on RA. He is cleared for discharge and will follow up with cards in 2 weeks.  Will complete course of azithro and levaquin for another 5 days. Plan discussed with pt/family and all are in agreement with the plan. All questions answered. Pt was stable at time of discharge. Follow up as per below. Significant Diagnostic Imaging:     TTe:    LEFT VENTRICLE: The ventricle was mildly dilated. Systolic function was   normal.  Ejection fraction was estimated in the range of 60 % to 65 %. There were no  regional wall motion abnormalities. There was mild concentric hypertrophy. Avg.  E/e'=10.5. Left ventricular diastolic function parameters were normal.    RIGHT VENTRICLE: The size was normal. Systolic function was normal.    LEFT ATRIUM: Size was normal.    RIGHT ATRIUM: Size was normal.    SYSTEMIC VEINS: IVC: The inferior vena cava was normal in size and course. AORTIC VALVE: The valve was structurally normal, tri-commissural. There was   no  evidence for stenosis. There was no insufficiency. MITRAL VALVE: Valve structure was normal. There was no evidence for stenosis. There was no regurgitation. TRICUSPID VALVE: The valve structure was normal. There was no evidence for  stenosis. There was trace regurgitation. PULMONIC VALVE: Not well visualized. There was no evidence for stenosis. There  was trivial regurgitation. PERICARDIUM: There was no pericardial effusion. AORTA: The root exhibited normal size. CXR:    FINDINGS: There is minimal right perihilar infiltrate. Left lung is clear. There is mild prominence of the heart.   The bony thorax is intact.       IMPRESSION  IMPRESSION: Right perihilar density, likely infiltrate    Labs: Results:       Chemistry Recent Labs      05/09/18   0630  05/08/18   0524  05/07/18   2226   GLU  113*  142*  155*   NA  140  139  140   K  3.9  4.0  3.3*   CL  106  104  103   CO2  26  25  25   BUN  8  8  10   CREA  0.91  1.05  1.12   CA  8.0*  7.7*  8.4   AGAP  8  10  12   AP  43*   --   61   TP  6.3   --   7.5   ALB  2.5*   --   3.2   GLOB  3.8*   --   4.3*   AGRAT  0.7*   -- 0.7*      CBC w/Diff Recent Labs      05/09/18   0630  05/07/18   2226   WBC  8.6  15.7*   RBC  4.52  5.19   HGB  13.3*  15.6   HCT  39.9*  45.8   PLT  232  218   GRANS  54  73   LYMPH  22  13   EOS  8*  4      Cardiac Enzymes No results for input(s): CPK, CKND1, KATE in the last 72 hours. No lab exists for component: CKRMB, TROIP   Coagulation No results for input(s): PTP, INR, APTT in the last 72 hours. No lab exists for component: INREXT    Last A1c Lab Results   Component Value Date/Time    Hemoglobin A1c 5.8 (H) 02/06/2018 09:58 AM      Lipid Panel Lab Results   Component Value Date/Time    Cholesterol, total 207 (H) 08/01/2017 10:54 AM    HDL Cholesterol 31 (L) 08/01/2017 10:54 AM    LDL, calculated 144 (H) 08/01/2017 10:54 AM    VLDL, calculated 32 08/01/2017 10:54 AM    Triglyceride 162 (H) 08/01/2017 10:54 AM      BNP No results for input(s): BNPP in the last 72 hours.    Liver Enzymes Recent Labs      05/09/18   0630   TP  6.3   ALB  2.5*   AP  43*   SGOT  18      Thyroid Studies Lab Results   Component Value Date/Time    TSH 2.156 05/07/2018 10:26 PM            Discharge Exam:  Patient Vitals for the past 24 hrs:   Temp Pulse Resp BP SpO2   05/09/18 0901 - - - - 94 %   05/09/18 0732 98.2 °F (36.8 °C) 75 18 119/76 98 %   05/09/18 0512 98.2 °F (36.8 °C) 76 18 121/79 98 %   05/09/18 0052 98.9 °F (37.2 °C) 81 18 124/73 96 %   05/08/18 2126 - - - - 94 %   05/08/18 2124 - - - - 94 %   05/08/18 1950 99.4 °F (37.4 °C) 84 16 144/72 93 %   05/08/18 1603 98.9 °F (37.2 °C) 87 23 137/77 91 %   05/08/18 1439 - 89 15 - -   05/08/18 1318 - - - - 93 %   05/08/18 1316 - 93 - 129/72 -   05/08/18 1300 - 83 24 129/72 92 %     Visit Vitals    /76 (BP 1 Location: Right arm, BP Patient Position: At rest;Head of bed elevated (Comment degrees))    Pulse 75    Temp 98.2 °F (36.8 °C)    Resp 18    Ht 5' 8\" (1.727 m)    Wt 107.5 kg (237 lb)    SpO2 94%    BMI 36.04 kg/m2     General appearance: alert, cooperative, no distress  Lungs: CTABL  Heart: RRR, no m/r/g  Abdomen: soft, non-tender. Bowel sounds normal.   Extremities: no cyanosis. Neurologic: Grossly normal    Disposition: Home  Discharge Condition: stable  Patient Instructions: cardiac diet  Activity as tolerated   Current Discharge Medication List      START taking these medications    Details   azithromycin (ZITHROMAX) 500 mg tab Take 1 Tab by mouth daily for 4 days. Qty: 4 Tab, Refills: 0      dilTIAZem CD (CARDIZEM CD) 240 mg ER capsule Take 1 Cap by mouth daily. Qty: 30 Cap, Refills: 0      levoFLOXacin (LEVAQUIN) 750 mg tablet Take 1 Tab by mouth daily. Qty: 5 Tab, Refills: 0         CONTINUE these medications which have NOT CHANGED    Details   tadalafil (CIALIS) 5 mg tablet Take 1 Tab by mouth daily. Qty: 30 Tab, Refills: 5      LORazepam (ATIVAN) 1 mg tablet Take 1 Tab by mouth nightly. Max Daily Amount: 1 mg. Qty: 30 Tab, Refills: 5    Associated Diagnoses: Insomnia, unspecified type      lisinopril (PRINIVIL, ZESTRIL) 20 mg tablet TAKE 1 TAB BY MOUTH DAILY. Qty: 90 Tab, Refills: 3      fluticasone (FLONASE) 50 mcg/actuation nasal spray 2 Sprays as needed for Rhinitis. cetirizine (ZYRTEC) 10 mg tablet Take 10 mg by mouth as needed for Allergies. multivitamin (DAILY VITAMIN) tablet Take 1 Tab by mouth daily. aspirin delayed-release 81 mg tablet Take  by mouth daily. Vaccinations:   Pt screened by nursing for pneumococcal and influenza vaccination needs at discharge, will be ordered if needed and pt consented. Immunization History   Administered Date(s) Administered    Influenza Vaccine 10/24/2012, 10/30/2013    Influenza Vaccine PF 10/16/2015    Td 09/15/2005       Follow-up Information     Follow up With Details Comments 98 Brandy Gustafson MD   [address] [facility]  [address] [facility]  [facility]            Time spent in the discharge process was 35 minutes.       Signed By: Luly Pozo MD     May 9, 2018

## 2018-05-09 NOTE — PROGRESS NOTES
05/08/18 2124   Oxygen Therapy   O2 Sat (%) 94 %   Pulse via Oximetry 90 beats per minute   O2 Device Nasal cannula  (in lieu of CPAP tx for MALISSA during HS.  Pt's CPAP is not here.)   O2 Flow Rate (L/min) 3 l/min

## 2018-05-09 NOTE — PROGRESS NOTES
Shift assessment complete. Respirations present. Even and unlabored. No s/s of distress. Zero c/o pain at this time. Call light within reach. Encouraged patient to call for assistance. Patient verbalizes understanding. See Doc Flowsheet for assessment details. Patient resting in bed. Saline well intact to left hand. Ambulates to bathroom. Voiding. No shortness of breath. 02/3l over night.

## 2018-05-09 NOTE — PROGRESS NOTES
05/09/18 0901   Oxygen Therapy   O2 Sat (%) 94 %   Pulse via Oximetry 86 beats per minute   O2 Device Room air   O2 Flow Rate (L/min) 0 l/min

## 2018-05-09 NOTE — PROGRESS NOTES
Discharge instructions and prescriptions provided and explained to the pt. Med side effect sheet reviewed. Opportunity for questions provided. Pt is ready to leave, he drove himself. Pt was escorted out.

## 2018-05-09 NOTE — DISCHARGE INSTRUCTIONS
DISCHARGE SUMMARY from Nurse    The following personal items are in your possession at time of discharge:    Dental Appliances: None  Visual Aid: None     Home Medications: None  Jewelry: None  Clothing: Footwear, Pants, Shirt  Other Valuables: None  Personal Items Sent to Safe: none/  \"offered to pt\" / declined          PATIENT INSTRUCTIONS:    After general anesthesia or intravenous sedation, for 24 hours or while taking prescription Narcotics:  · Limit your activities  · Do not drive and operate hazardous machinery  · Do not make important personal or business decisions  · Do  not drink alcoholic beverages  · If you have not urinated within 8 hours after discharge, please contact your surgeon on call. Report the following to your surgeon:  · Excessive pain, swelling, redness or odor of or around the surgical area  · Temperature over 100.5  · Nausea and vomiting lasting longer than 4 hours or if unable to take medications  · Any signs of decreased circulation or nerve impairment to extremity: change in color, persistent  numbness, tingling, coldness or increase pain  · Any questions        What to do at Home:  Recommended activity: Activity as tolerated, per MD    If you experience any of the following symptoms fever>101, pain unrelieved with medication, nausea/vomiting, shortness of breath, dizziness/fainting, chest pain. , please follow up with your doctor. *  Please give a list of your current medications to your Primary Care Provider. *  Please update this list whenever your medications are discontinued, doses are      changed, or new medications (including over-the-counter products) are added. *  Please carry medication information at all times in case of emergency situations.           These are general instructions for a healthy lifestyle:    No smoking/ No tobacco products/ Avoid exposure to second hand smoke    Surgeon General's Warning:  Quitting smoking now greatly reduces serious risk to your health. Obesity, smoking, and sedentary lifestyle greatly increases your risk for illness    A healthy diet, regular physical exercise & weight monitoring are important for maintaining a healthy lifestyle    You may be retaining fluid if you have a history of heart failure or if you experience any of the following symptoms:  Weight gain of 3 pounds or more overnight or 5 pounds in a week, increased swelling in our hands or feet or shortness of breath while lying flat in bed. Please call your doctor as soon as you notice any of these symptoms; do not wait until your next office visit. Recognize signs and symptoms of STROKE:    F-face looks uneven    A-arms unable to move or move unevenly    S-speech slurred or non-existent    T-time-call 911 as soon as signs and symptoms begin-DO NOT go       Back to bed or wait to see if you get better-TIME IS BRAIN. Warning Signs of HEART ATTACK     Call 911 if you have these symptoms:   Chest discomfort. Most heart attacks involve discomfort in the center of the chest that lasts more than a few minutes, or that goes away and comes back. It can feel like uncomfortable pressure, squeezing, fullness, or pain.  Discomfort in other areas of the upper body. Symptoms can include pain or discomfort in one or both arms, the back, neck, jaw, or stomach.  Shortness of breath with or without chest discomfort.  Other signs may include breaking out in a cold sweat, nausea, or lightheadedness. Don't wait more than five minutes to call 911 - MINUTES MATTER! Fast action can save your life. Calling 911 is almost always the fastest way to get lifesaving treatment. Emergency Medical Services staff can begin treatment when they arrive -- up to an hour sooner than if someone gets to the hospital by car. The discharge information has been reviewed with the patient. The patient verbalized understanding.     Discharge medications reviewed with the patient and appropriate educational materials and side effects teaching were provided. Learning About Atrial Fibrillation  What is atrial fibrillation? Atrial fibrillation (say \"AY-tree-baron kku-stir-OQP-shun\") is the most common type of irregular heartbeat (arrhythmia). Normally, the heart beats in a strong, steady rhythm. In atrial fibrillation, a problem with the heart's electrical system causes the two upper parts of the heart (the atria) to quiver, or fibrillate. Your heart rate also may be faster than normal.  Atrial fibrillation can be dangerous because if the heartbeat isn't strong and steady, blood can collect, or pool, in the atria. And pooled blood is more likely to form clots. Clots can travel to the brain, block blood flow, and cause a stroke. Atrial fibrillation can also lead to heart failure. Treatment for atrial fibrillation helps prevent stroke and heart failure. It also helps relieve symptoms. Atrial fibrillation is often caused by another heart problem. It may happen after heart surgery. It may also be caused by other problems, such as an overactive thyroid gland or lung disease. Many people with atrial fibrillation are able to live full and active lives. What are the symptoms? Some people feel symptoms when they have episodes of atrial fibrillation. But other people don't notice any symptoms. If you have symptoms, you may feel:  · A fluttering, racing, or pounding feeling in your chest called palpitations. · Weak or tired. · Dizzy or lightheaded. · Short of breath. · Chest pain. · Confused. You may notice signs of atrial fibrillation when you check your pulse. Your pulse may seem uneven or fast.  What can you expect when you have atrial fibrillation? At first, spells of atrial fibrillation may come on suddenly and last a short time. It may go away on its own or it goes away after treatment. This is called paroxysmal atrial fibrillation.   Over time, the spells may last longer and occur more often. They often don't go away on their own. How is it treated? Treatments can help you feel better and prevent future problems, especially stroke and heart failure. The main types of treatment slow the heart rate, control the heart rhythm, and help prevent stroke. Your treatment will depend on the cause of your atrial fibrillation, your symptoms, and your risk for stroke. · Heart rate treatment. Medicine may be used to slow your heart rate. Your heartbeat may still be irregular. But these medicines keep your heart from beating too fast. They may also help relieve your symptoms. · Heart rhythm treatment. Different treatments may be used to try to stop atrial fibrillation and keep it from returning. They can also relieve symptoms. These treatments include medicine, electrical cardioversion to shock the heart back to a normal rhythm, a procedure called catheter ablation, and heart surgery. · Stroke prevention. You and your doctor can decide how to lower your risk. You may decide to take a blood-thinning medicine, such as aspirin or an anticoagulant. How can you live well with it? You can live well and help manage atrial fibrillation by having a heart-healthy lifestyle. To have a heart-healthy lifestyle:  · Don't smoke. · Eat heart-healthy foods. · Be active. Talk to your doctor about what type and level of exercise is safe for you. · Stay at a healthy weight. Lose weight if you need to. · Manage stress. · Avoid alcohol if it triggers symptoms. · Manage other health problems such as high blood pressure, high cholesterol, and diabetes. · Avoid getting sick from the flu. Get a flu shot every year. Where can you learn more? Go to http://анна-stephanie.info/. Enter [phone] in the search box to learn more about \"Learning About Atrial Fibrillation. \"  Current as of: September 21, 2016  Content Version: 11.4  © 4512-3081 [facility].  Care instructions adapted under license by [employer] (which disclaims liability or warranty for this information). If you have questions about a medical condition or this instruction, always ask your healthcare professional. [address] any warranty or liability for your use of this information.

## 2018-05-09 NOTE — PROGRESS NOTES
Assessment completed and documented. Pt resting in bed awake and alert, oriented x4. PIV on right and left arms intact, pt on room air, voiding in bathroom, heart sounds S1S2 and regular,   VSS, lungs clear on room air, no acute distress observed, no complaints offered at this time. Call bell in reach, bed locked and low, will continue to monitor hourly and as needed.

## 2018-05-10 LAB
BACTERIA SPEC CULT: NORMAL
GRAM STN SPEC: NORMAL
SERVICE CMNT-IMP: NORMAL

## 2018-05-14 PROBLEM — G47.30 SLEEP APNEA IN ADULT: Status: ACTIVE | Noted: 2018-05-14

## 2018-05-14 PROBLEM — I10 ESSENTIAL HYPERTENSION WITH GOAL BLOOD PRESSURE LESS THAN 130/85: Status: ACTIVE | Noted: 2018-05-14

## 2018-05-19 ENCOUNTER — HOSPITAL ENCOUNTER (OUTPATIENT)
Dept: MRI IMAGING | Age: 63
Discharge: HOME | End: 2018-05-19
Attending: UROLOGY
Payer: COMMERCIAL

## 2018-05-19 DIAGNOSIS — R97.20 ELEVATED PSA: ICD-10-CM

## 2018-05-19 PROCEDURE — 74011250636 HC RX REV CODE- 250/636: Performed by: UROLOGY

## 2018-05-19 PROCEDURE — 74011000258 HC RX REV CODE- 258: Performed by: UROLOGY

## 2018-05-19 PROCEDURE — A9577 INJ MULTIHANCE: HCPCS | Performed by: UROLOGY

## 2018-05-19 PROCEDURE — 72197 MRI PELVIS W/O & W/DYE: CPT

## 2018-05-19 RX ORDER — SODIUM CHLORIDE 0.9 % (FLUSH) 0.9 %
10 SYRINGE (ML) INJECTION
Status: COMPLETED | OUTPATIENT
Start: 2018-05-19 | End: 2018-05-19

## 2018-05-19 RX ADMIN — Medication 10 ML: at 12:24

## 2018-05-19 RX ADMIN — GADOBENATE DIMEGLUMINE 10 ML: 529 INJECTION, SOLUTION INTRAVENOUS at 12:22

## 2018-05-19 RX ADMIN — SODIUM CHLORIDE 100 ML: 900 INJECTION, SOLUTION INTRAVENOUS at 12:24

## 2018-06-12 PROBLEM — R39.9 LOWER URINARY TRACT SYMPTOMS (LUTS): Status: ACTIVE | Noted: 2018-06-12

## 2020-08-13 PROBLEM — I48.0 PAF (PAROXYSMAL ATRIAL FIBRILLATION) (HCC): Status: ACTIVE | Noted: 2020-08-13

## 2022-02-04 ENCOUNTER — APPOINTMENT (RX ONLY)
Dept: URBAN - METROPOLITAN AREA CLINIC 330 | Facility: CLINIC | Age: 67
Setting detail: DERMATOLOGY
End: 2022-02-04

## 2022-02-04 DIAGNOSIS — B02.9 ZOSTER WITHOUT COMPLICATIONS: ICD-10-CM

## 2022-02-04 PROCEDURE — 99213 OFFICE O/P EST LOW 20 MIN: CPT

## 2022-02-04 PROCEDURE — ? PRESCRIPTION MEDICATION MANAGEMENT

## 2022-02-04 PROCEDURE — ? COUNSELING

## 2022-02-04 PROCEDURE — ? PRESCRIPTION

## 2022-02-04 RX ORDER — TRIAMCINOLONE ACETONIDE 1 MG/G
CREAM TOPICAL
Qty: 454 | Refills: 1 | Status: ERX | COMMUNITY
Start: 2022-02-04

## 2022-02-04 RX ORDER — VALACYCLOVIR HYDROCHLORIDE 1 G/1
TABLET, FILM COATED ORAL TID
Qty: 45 | Refills: 0 | Status: ERX

## 2022-02-04 RX ADMIN — TRIAMCINOLONE ACETONIDE: 1 CREAM TOPICAL at 00:00

## 2022-02-04 ASSESSMENT — LOCATION DETAILED DESCRIPTION DERM
LOCATION DETAILED: RIGHT PROXIMAL PRETIBIAL REGION
LOCATION DETAILED: RIGHT ANTERIOR DISTAL THIGH
LOCATION DETAILED: RIGHT ANTERIOR PROXIMAL THIGH

## 2022-02-04 ASSESSMENT — LOCATION SIMPLE DESCRIPTION DERM
LOCATION SIMPLE: RIGHT THIGH
LOCATION SIMPLE: RIGHT PRETIBIAL REGION

## 2022-02-04 ASSESSMENT — LOCATION ZONE DERM: LOCATION ZONE: LEG

## 2022-02-04 NOTE — HPI: RASH
What Type Of Note Output Would You Prefer (Optional)?: Standard Output
Is The Patient Presenting As Previously Scheduled?: No, they are a work-in
How Severe Is Your Rash?: severe
Is This A New Presentation, Or A Follow-Up?: Rash
Additional Notes: Site resolved. No further treatment needed.

## 2022-02-04 NOTE — PROCEDURE: PRESCRIPTION MEDICATION MANAGEMENT
Render In Strict Bullet Format?: No
Detail Level: Zone
Initiate Treatment: Valtrex 1g TID dose 15 days\\nTylenol or advil every 6 hours\\nTriamcinolone cream 0.1%\\n50/50 water and vinegar compress

## 2022-03-18 PROBLEM — I48.0 PAF (PAROXYSMAL ATRIAL FIBRILLATION) (HCC): Status: ACTIVE | Noted: 2020-08-13

## 2022-03-19 PROBLEM — J18.9 CAP (COMMUNITY ACQUIRED PNEUMONIA): Status: ACTIVE | Noted: 2018-05-07

## 2022-03-19 PROBLEM — G47.30 SLEEP APNEA IN ADULT: Status: ACTIVE | Noted: 2018-05-14

## 2022-03-19 PROBLEM — I10 ESSENTIAL HYPERTENSION WITH GOAL BLOOD PRESSURE LESS THAN 130/85: Status: ACTIVE | Noted: 2018-05-14

## 2022-03-19 PROBLEM — I48.91 ATRIAL FIBRILLATION WITH RVR (HCC): Status: ACTIVE | Noted: 2018-05-07

## 2022-03-19 PROBLEM — E87.6 HYPOKALEMIA: Status: ACTIVE | Noted: 2018-05-07

## 2022-03-19 PROBLEM — R39.9 LOWER URINARY TRACT SYMPTOMS (LUTS): Status: ACTIVE | Noted: 2018-06-12

## 2022-03-20 PROBLEM — D72.829 LEUKOCYTOSIS: Status: ACTIVE | Noted: 2018-05-07

## 2022-03-20 PROBLEM — E66.01 SEVERE OBESITY (BMI 35.0-39.9) WITH COMORBIDITY (HCC): Status: ACTIVE | Noted: 2018-04-11

## 2022-03-22 ENCOUNTER — APPOINTMENT (RX ONLY)
Dept: URBAN - METROPOLITAN AREA CLINIC 330 | Facility: CLINIC | Age: 67
Setting detail: DERMATOLOGY
End: 2022-03-22

## 2022-03-22 DIAGNOSIS — B02.9 ZOSTER WITHOUT COMPLICATIONS: ICD-10-CM

## 2022-03-22 DIAGNOSIS — B02.29 OTHER POSTHERPETIC NERVOUS SYSTEM INVOLVEMENT: ICD-10-CM

## 2022-03-22 PROCEDURE — ? COUNSELING

## 2022-03-22 PROCEDURE — ? PRESCRIPTION MEDICATION MANAGEMENT

## 2022-03-22 PROCEDURE — ? ADDITIONAL NOTES

## 2022-03-22 PROCEDURE — 99213 OFFICE O/P EST LOW 20 MIN: CPT

## 2022-03-22 ASSESSMENT — LOCATION ZONE DERM: LOCATION ZONE: LEG

## 2022-03-22 ASSESSMENT — LOCATION DETAILED DESCRIPTION DERM
LOCATION DETAILED: RIGHT ANTERIOR PROXIMAL THIGH
LOCATION DETAILED: RIGHT ANTERIOR DISTAL THIGH

## 2022-03-22 ASSESSMENT — LOCATION SIMPLE DESCRIPTION DERM: LOCATION SIMPLE: RIGHT THIGH

## 2022-03-22 NOTE — PROCEDURE: PRESCRIPTION MEDICATION MANAGEMENT
Continue Regimen: Tylenol for inflammation \\nTriamcinolone 0.1% cream
Render In Strict Bullet Format?: No
Detail Level: Zone

## 2022-07-05 DIAGNOSIS — R39.9 UNSPECIFIED SYMPTOMS AND SIGNS INVOLVING THE GENITOURINARY SYSTEM: ICD-10-CM

## 2022-07-05 RX ORDER — TAMSULOSIN HYDROCHLORIDE 0.4 MG/1
CAPSULE ORAL
Qty: 30 CAPSULE | Refills: 11 | Status: SHIPPED | OUTPATIENT
Start: 2022-07-05

## 2022-08-17 DIAGNOSIS — R97.20 ELEVATED PROSTATE SPECIFIC ANTIGEN (PSA): Primary | ICD-10-CM

## 2022-11-30 ENCOUNTER — OFFICE VISIT (OUTPATIENT)
Dept: UROLOGY | Age: 67
End: 2022-11-30
Payer: MEDICARE

## 2022-11-30 DIAGNOSIS — R97.20 ELEVATED PROSTATE SPECIFIC ANTIGEN (PSA): Primary | ICD-10-CM

## 2022-11-30 DIAGNOSIS — N52.9 ERECTILE DYSFUNCTION, UNSPECIFIED ERECTILE DYSFUNCTION TYPE: ICD-10-CM

## 2022-11-30 DIAGNOSIS — N40.0 BENIGN PROSTATIC HYPERPLASIA WITHOUT LOWER URINARY TRACT SYMPTOMS: ICD-10-CM

## 2022-11-30 DIAGNOSIS — R39.9 UNSPECIFIED SYMPTOMS AND SIGNS INVOLVING THE GENITOURINARY SYSTEM: ICD-10-CM

## 2022-11-30 LAB
BILIRUBIN, URINE, POC: NEGATIVE
BLOOD URINE, POC: NEGATIVE
GLUCOSE URINE, POC: NEGATIVE
KETONES, URINE, POC: NEGATIVE
LEUKOCYTE ESTERASE, URINE, POC: NEGATIVE
NITRITE, URINE, POC: NEGATIVE
PH, URINE, POC: 6 (ref 4.6–8)
PROTEIN,URINE, POC: NEGATIVE
SPECIFIC GRAVITY, URINE, POC: 1.02 (ref 1–1.03)
URINALYSIS CLARITY, POC: NORMAL
URINALYSIS COLOR, POC: NORMAL
UROBILINOGEN, POC: NORMAL

## 2022-11-30 PROCEDURE — 4004F PT TOBACCO SCREEN RCVD TLK: CPT | Performed by: UROLOGY

## 2022-11-30 PROCEDURE — G8421 BMI NOT CALCULATED: HCPCS | Performed by: UROLOGY

## 2022-11-30 PROCEDURE — 81003 URINALYSIS AUTO W/O SCOPE: CPT | Performed by: UROLOGY

## 2022-11-30 PROCEDURE — G8428 CUR MEDS NOT DOCUMENT: HCPCS | Performed by: UROLOGY

## 2022-11-30 PROCEDURE — G8484 FLU IMMUNIZE NO ADMIN: HCPCS | Performed by: UROLOGY

## 2022-11-30 PROCEDURE — 3017F COLORECTAL CA SCREEN DOC REV: CPT | Performed by: UROLOGY

## 2022-11-30 PROCEDURE — 1123F ACP DISCUSS/DSCN MKR DOCD: CPT | Performed by: UROLOGY

## 2022-11-30 PROCEDURE — 99213 OFFICE O/P EST LOW 20 MIN: CPT | Performed by: UROLOGY

## 2022-11-30 RX ORDER — TAMSULOSIN HYDROCHLORIDE 0.4 MG/1
CAPSULE ORAL
Qty: 30 CAPSULE | Refills: 11 | Status: SHIPPED | OUTPATIENT
Start: 2022-11-30

## 2022-11-30 RX ORDER — TADALAFIL 20 MG/1
20 TABLET ORAL PRN
Qty: 6 TABLET | Refills: 12 | Status: SHIPPED | OUTPATIENT
Start: 2022-11-30

## 2022-11-30 ASSESSMENT — ENCOUNTER SYMPTOMS
EYES NEGATIVE: 1
RESPIRATORY NEGATIVE: 1

## 2022-11-30 NOTE — PROGRESS NOTES
[facility]  [address] [address]  [address]  : 1955    Chief Complaint   Patient presents with    Elevated PSA        HPI     Mercedes Yo is a 74 y.o. adult   Hx  of elevated PSA. PSA 4.9 with PSA of 5.3 on repeat sample in . PSA of 5.3 with 17% free PSA. Positive family history of prostate cancer, father and brother. Biopsy  showed 28 g prostate with PSA density of 0.17. Benign pathology. PSA 4.19 May -06. PSA and free total, 2.77, 29% free PSA,. 14% chance of positive biopsy. PSA 2.68 in . PSA 2.12 in . PSA 2.74 in . PSA level of 7.3 ng/ml in . Prior PSAs have been high (7.0 in -15, 4.5 in -16, 4.6 in , 6.9 in .). In  total PSA 8.7 with 21.4 % free PSA. Had MRI 18: IMPRESSION:  1. Possible low-grade tumor in the peripheral zone bilaterally. Small 6 mm  discrete lesion in the mid left peripheral zone, intermediate probability of clinically significant tumor. PI-RADS 3.  2. No evidence of significant adenopathy or metastatic disease in the lower Abdomen/pelvis. approx prostate wieght 85 gm. 4K score done in 2018 at pt's request, score is 1 which gives <1% chance of developing metastatic prostate cancer over the next 10 years. Total PSA was 5.82 with 26 % free PSA in . In 3-19, total PSA 9.5, 19.9% free PSA. Voiding better on Flomax. He has not wanted to have bx as recommended. In , total PSA 7.4 with 31.5% free PSA. PSA 6.7 in . In , total PSA 5.2 with 40% free PSA. He is still having weak stream despite the Flomax.      Past Medical History:   Diagnosis Date    Allergic rhinitis 3/10/2015    Diverticulosis     Essential hypertension, benign 3/10/2015    Hypertrophy of prostate without urinary obstruction and other lower urinary tract symptoms (LUTS) 3/10/2015    Impotence of organic origin 3/10/2015    Insomnia     Mixed hyperlipidemia     Obesity (BMI 35.0-39.9 without comorbidity)     Sleep apnea 06/2015    CPAP-Dr. Shweta Diggs     Past Surgical History:   Procedure Laterality Date    COLONOSCOPY  10/18/2017    Dr. Star Hodge- diverticulosis, int hemorrhoids    COLONOSCOPY  05/31/2012    Hyperplastic polyp- Dr. Star Hodge    COLONOSCOPY  12/23/2008    colon polyps, diverticulosis and int hemorrhoids    OTHER SURGICAL HISTORY  10/1/2005    negative    UROLOGICAL SURGERY  10/2005    negative- Dr. Jonathon Louis     Current Outpatient Medications   Medication Sig Dispense Refill    tamsulosin (FLOMAX) 0.4 MG capsule TAKE 1 CAPSULE BY MOUTH EVERY DAY 30 capsule 11    tadalafil (CIALIS) 20 MG tablet Take 1 tablet by mouth as needed for Erectile Dysfunction 6 tablet 12    aspirin 81 MG EC tablet Take by mouth daily      atorvastatin (LIPITOR) 10 MG tablet Take by mouth daily      cetirizine (ZYRTEC) 10 MG tablet Take 10 mg by mouth as needed      dilTIAZem (TIAZAC) 240 MG extended release capsule Take 240 mg by mouth daily      fluticasone (FLONASE) 50 MCG/ACT nasal spray 2 sprays as needed      lisinopril (PRINIVIL;ZESTRIL) 20 MG tablet TAKE 1 TAB BY MOUTH DAILY. LORazepam (ATIVAN) 1 MG tablet Take 1 mg by mouth.      metFORMIN (GLUCOPHAGE) 500 MG tablet Take 500 mg by mouth daily (with breakfast)       No current facility-administered medications for this visit.      Allergies   Allergen Reactions    Penicillins Other (See Comments)     Social History     Socioeconomic History    Marital status: Single     Spouse name: Not on file    Number of children: Not on file    Years of education: Not on file    Highest education level: Not on file   Occupational History    Not on file   Tobacco Use    Smoking status: Never    Smokeless tobacco: Never   Substance and Sexual Activity    Alcohol use: Yes    Drug use: No    Sexual activity: Not on file   Other Topics Concern    Not on file   Social History Narrative    Not on file     Social Determinants of Health     Financial Resource Strain: Not on file   Food Insecurity: Not on file   Transportation Needs: Not on file   Physical Activity: Not on file   Stress: Not on file   Social Connections: Not on file   Intimate Partner Violence: Not on file   Housing Stability: Not on file     Family History   Problem Relation Age of Onset    Asthma Sister     Cancer Other     Prostate Cancer Brother     Cancer Brother     Prostate Cancer Father     Cancer Father     Cancer Mother        Review of Systems  Constitutional: Negative  Skin: Negative skin ROS  Eyes: Eyes negative  ENT: HENT negative  Respiratory: Respiratory negative  Cardiovascular: Positive for hypertension and irregular heartbeat. Genitourinary: Genitourinary negative  Musculoskeletal: Musculoskeletal negative  Neurological: Neg neuro ROS  Psychological: Neg psych ROS  Endocrine: Endocrine negative  Hem/Lymphatic: Hematologic/lymphatic negative    There were no vitals taken for this visit. Urinalysis  UA - Dipstick  Results for orders placed or performed in visit on 11/30/22   AMB POC URINALYSIS DIP STICK AUTO W/O MICRO   Result Value Ref Range    Color (UA POC)      Clarity (UA POC)      Glucose, Urine, POC Negative Negative    Bilirubin, Urine, POC Negative Negative    KETONES, Urine, POC Negative Negative    Specific Gravity, Urine, POC 1.025 1.001 - 1.035    Blood (UA POC) Negative Negative    pH, Urine, POC 6.0 4.6 - 8.0    Protein, Urine, POC Negative Negative    Urobilinogen, POC Normal     Nitrite, Urine, POC Negative Negative    Leukocyte Esterase, Urine, POC Negative Negative       UA - Micro  WBC - 0  RBC - 0  Bacteria - 0  Epith - 0    Physical Exam    Assessment and Plan    ICD-10-CM    1. Elevated prostate specific antigen (PSA)  R97.20 AMB POC URINALYSIS DIP STICK AUTO W/O MICRO     PSA, Diagnostic      2. Benign prostatic hyperplasia without lower urinary tract symptoms  N40.0 AMB POC URINALYSIS DIP STICK AUTO W/O MICRO      3.  Unspecified symptoms and signs involving the genitourinary system  R39.9 tamsulosin (FLOMAX) 0.4 MG capsule      4. Erectile dysfunction, unspecified erectile dysfunction type  N52.9 tadalafil (CIALIS) 20 MG tablet          Orders Placed This Encounter   Procedures    PSA, Diagnostic     Standing Status:   Future     Standing Expiration Date:   5/30/2024    AMB POC URINALYSIS DIP STICK AUTO W/O MICRO     Favorable % free PSA. Follow-up and Dispositions    Return in about 1 year (around 11/30/2023) for appt, PSA before. discussed medical and surgical options for BPH. He would like to continue the tamsulosin. Will refill Cialis prn.

## 2023-01-19 ENCOUNTER — OFFICE VISIT (OUTPATIENT)
Dept: SURGERY | Age: 68
End: 2023-01-19

## 2023-01-19 VITALS
HEART RATE: 85 BPM | DIASTOLIC BLOOD PRESSURE: 92 MMHG | BODY MASS INDEX: 35.56 KG/M2 | WEIGHT: 233.9 LBS | SYSTOLIC BLOOD PRESSURE: 155 MMHG

## 2023-01-19 DIAGNOSIS — R10.10 UPPER ABDOMINAL PAIN: ICD-10-CM

## 2023-01-19 DIAGNOSIS — K80.20 GALLSTONES: Primary | ICD-10-CM

## 2023-01-19 DIAGNOSIS — M62.08 RECTUS DIASTASIS: ICD-10-CM

## 2023-01-19 DIAGNOSIS — K42.9 UMBILICAL HERNIA WITHOUT OBSTRUCTION AND WITHOUT GANGRENE: ICD-10-CM

## 2023-01-19 DIAGNOSIS — E66.9 OBESITY WITH BODY MASS INDEX GREATER THAN 30: ICD-10-CM

## 2023-01-19 ASSESSMENT — ENCOUNTER SYMPTOMS
SORE THROAT: 0
COUGH: 0
EYE DISCHARGE: 0
DIARRHEA: 0
VOMITING: 0
NAUSEA: 0
WHEEZING: 0
SINUS PAIN: 0
BACK PAIN: 0
ABDOMINAL PAIN: 1
SHORTNESS OF BREATH: 0
EYE ITCHING: 0
CONSTIPATION: 0
SINUS PRESSURE: 0
EYE PAIN: 0

## 2023-01-19 NOTE — PROGRESS NOTES
Angel Reddy MD, FACS  [address], [address]  [place] [address]  Phone [phone]   Fax [phone]      Date of visit: 1/20/2023     Primary/Requesting provider: Nathaly Souza MD    Chief Complaint   Patient presents with    New Patient     NP - Chronic cholecystitis         Patient is a 67 y.o. adult who presents for discussion of gallstones.  He reports at least 3 months of symptoms, which essentially includes just pain.  This is located in various locations in the upper abdomen (\"moves around\") but is most prominent in the RUQ and is sometimes in the LUQ.  He does not occasional pain in the right infrascapular region, as well, but notes that this actually a pain he has intermittently noted for \"years\".  The abdominal pain, regardless of location, had been present on a daily basis for awhile, with the longest episode lasting 5 hours which occurred after eating breakfast at [employer].  He notes tylenol seems to work for the pain.  Episodes lately have only been minimal, which he attributes to walking regularly and possibly decreasing his fat intake- he does admit to a rather poor diet with significant fast food intake.    Medications:   Current Outpatient Medications on File Prior to Visit   Medication Sig Dispense Refill    tamsulosin (FLOMAX) 0.4 MG capsule TAKE 1 CAPSULE BY MOUTH EVERY DAY 30 capsule 11    tadalafil (CIALIS) 20 MG tablet Take 1 tablet by mouth as needed for Erectile Dysfunction 6 tablet 12    aspirin 81 MG EC tablet Take by mouth daily      atorvastatin (LIPITOR) 10 MG tablet Take by mouth daily      cetirizine (ZYRTEC) 10 MG tablet Take 10 mg by mouth as needed      fluticasone (FLONASE) 50 MCG/ACT nasal spray 2 sprays as needed      lisinopril (PRINIVIL;ZESTRIL) 20 MG tablet TAKE 1 TAB BY MOUTH DAILY.      metFORMIN (GLUCOPHAGE) 500 MG tablet Take 500 mg by mouth daily (with breakfast)      dilTIAZem (TIAZAC) 240 MG extended release capsule Take 240 mg by  mouth daily (Patient not taking: Reported on 1/19/2023)      LORazepam (ATIVAN) 1 MG tablet Take 1 mg by mouth. (Patient not taking: Reported on 1/19/2023)       No current facility-administered medications on file prior to visit. Allergies:    Allergies   Allergen Reactions    Penicillins Other (See Comments)        Past History:  Past Medical History:   Diagnosis Date    Allergic rhinitis 3/10/2015    Diverticulosis     Essential hypertension, benign 3/10/2015    Hypertrophy of prostate without urinary obstruction and other lower urinary tract symptoms (LUTS) 3/10/2015    Impotence of organic origin 3/10/2015    Insomnia     Mixed hyperlipidemia     Obesity (BMI 35.0-39.9 without comorbidity)     Sleep apnea 06/2015    CPAP-Dr. Syed Mariee      Past Surgical History:   Procedure Laterality Date    COLONOSCOPY  10/18/2017    Dr. Kita Jaramillo- diverticulosis, int hemorrhoids    COLONOSCOPY  05/31/2012    Hyperplastic polyp- Dr. Kita Jaramillo    COLONOSCOPY  12/23/2008    colon polyps, diverticulosis and int hemorrhoids    OTHER SURGICAL HISTORY  10/1/2005    negative    UROLOGICAL SURGERY  10/2005    negative- Dr. Akash Reddy and Social History:  Family History   Problem Relation Age of Onset    Asthma Sister     Cancer Other     Prostate Cancer Brother     Cancer Brother     Prostate Cancer Father     Cancer Father     Cancer Mother      Social History     Socioeconomic History    Marital status: Single     Spouse name: Not on file    Number of children: Not on file    Years of education: Not on file    Highest education level: Not on file   Occupational History    Not on file   Tobacco Use    Smoking status: Never    Smokeless tobacco: Never   Substance and Sexual Activity    Alcohol use: Yes    Drug use: No    Sexual activity: Not on file   Other Topics Concern    Not on file   Social History Narrative    Not on file     Social Determinants of Health     Financial Resource Strain: Not on file   Food Insecurity: Not on file   Transportation Needs: Not on file   Physical Activity: Not on file   Stress: Not on file   Social Connections: Not on file   Intimate Partner Violence: Not on file   Housing Stability: Not on file           Review of Systems   Constitutional:  Negative for chills, fatigue and fever. HENT:  Negative for sinus pressure, sinus pain, sneezing and sore throat. Eyes:  Negative for pain, discharge and itching. Glasses for driving   Respiratory:  Negative for cough, shortness of breath and wheezing. Cardiovascular:  Positive for palpitations. Negative for chest pain. Dr. Bailey [facility]   Gastrointestinal:  Positive for abdominal pain. Negative for constipation, diarrhea, nausea and vomiting. Endocrine: Negative for cold intolerance, heat intolerance and polyuria. Genitourinary:  Positive for frequency. Negative for difficulty urinating and dysuria. Musculoskeletal:  Negative for back pain, joint swelling and myalgias. Allergic/Immunologic: Negative for environmental allergies and food allergies. Neurological:  Negative for dizziness, light-headedness and headaches. Hematological:  Does not bruise/bleed easily. Psychiatric/Behavioral:  Negative for confusion. The patient is not nervous/anxious and is not hyperactive. Physical Exam  BP (!) 155/92   Pulse 85   Wt 233 lb 14.4 oz (106.1 kg)   BMI 35.56 kg/m²   General Appearance: In no acute distress   Ears/Nose/Mouth/Throat:   Hearing grossly normal.         Neck: Supple. Chest:   Lungs clear to auscultation bilaterally. Cardiovascular:  Regular rate and rhythm, S1, S2 normal, no murmur. Abdomen:   Soft. No tenderness in RUQ or LUQ (or anywhere).   Diminutive umbilical hernia  Minimal rectus diastasis   Extremities: No gross deformity    Neuro: alert and oriented x 3              US Result (most recent):  No results found for this or any previous visit from the past 3650 days. Report of outside [address] ([facility], 8/1/22) reveals uncomplicated gallstones, minimally enlarged liver (23cm) with prob fatty infiltration. ASSESSMENT and PLAN:    1. Gallstones    2. Upper abdominal pain    3. Obesity with body mass index greater than 30    4. Rectus diastasis    5. Umbilical hernia without obstruction and without gangrene       Symptoms at present not overwhelmingly consisting with gallbladder source, although it would be high in the differential, which would also include constipation, IBS, gastritis. While not unreasonable, would not strongly recommend cholecystectomy based on current picture. Discussed natural history of symptomatic stones- progressive increase in frequency and severity with eventual addition of typical findings such as n/v, bloating and a post-prandial pattern. Stone complications, including cholecystitis, cholangitis, pancreatitis, are discussed but are not statistically likely. Discussed possibility of cholecystectomy. Technical details of the procedure are reviewed, including the non-standard single incision (SILS) technique if indicated. Risks reviewed include anesthetic risks, bleeding, infection, visceral injury including bile leak, incomplete symptom resolution and persistent post-operative diarrhea as well as conversion to open technique. All questions are answered. He will call if he chooses to proceed in the near future, otherwise will call if symptoms worsen to discuss further.

## 2023-10-13 ENCOUNTER — TELEPHONE (OUTPATIENT)
Dept: UROLOGY | Age: 68
End: 2023-10-13

## 2023-10-13 NOTE — TELEPHONE ENCOUNTER
----- Message from Nadeem Johnston MA sent at 9/29/2023  9:06 AM EDT -----  Please look at scanned media for PSA labs from outside source

## 2024-09-24 ENCOUNTER — TELEPHONE (OUTPATIENT)
Dept: UROLOGY | Age: 69
End: 2024-09-24

## 2024-11-20 ENCOUNTER — OFFICE VISIT (OUTPATIENT)
Dept: UROLOGY | Age: 69
End: 2024-11-20
Payer: MEDICARE

## 2024-11-20 DIAGNOSIS — N40.0 BENIGN PROSTATIC HYPERPLASIA WITHOUT LOWER URINARY TRACT SYMPTOMS: ICD-10-CM

## 2024-11-20 DIAGNOSIS — R97.20 ELEVATED PROSTATE SPECIFIC ANTIGEN (PSA): Primary | ICD-10-CM

## 2024-11-20 DIAGNOSIS — N52.9 ERECTILE DYSFUNCTION, UNSPECIFIED ERECTILE DYSFUNCTION TYPE: ICD-10-CM

## 2024-11-20 DIAGNOSIS — R39.9 UNSPECIFIED SYMPTOMS AND SIGNS INVOLVING THE GENITOURINARY SYSTEM: ICD-10-CM

## 2024-11-20 LAB
BILIRUBIN, URINE, POC: NEGATIVE
BLOOD URINE, POC: NEGATIVE
GLUCOSE URINE, POC: NEGATIVE MG/DL
KETONES, URINE, POC: NEGATIVE MG/DL
LEUKOCYTE ESTERASE, URINE, POC: NORMAL
NITRITE, URINE, POC: NEGATIVE
PH, URINE, POC: 5.5 (ref 4.6–8)
PROTEIN,URINE, POC: NEGATIVE MG/DL
SPECIFIC GRAVITY, URINE, POC: 1.01 (ref 1–1.03)
URINALYSIS CLARITY, POC: NORMAL
URINALYSIS COLOR, POC: NORMAL
UROBILINOGEN, POC: NORMAL MG/DL

## 2024-11-20 PROCEDURE — G8427 DOCREV CUR MEDS BY ELIG CLIN: HCPCS | Performed by: UROLOGY

## 2024-11-20 PROCEDURE — 99213 OFFICE O/P EST LOW 20 MIN: CPT | Performed by: UROLOGY

## 2024-11-20 PROCEDURE — G8421 BMI NOT CALCULATED: HCPCS | Performed by: UROLOGY

## 2024-11-20 PROCEDURE — G8484 FLU IMMUNIZE NO ADMIN: HCPCS | Performed by: UROLOGY

## 2024-11-20 PROCEDURE — 81003 URINALYSIS AUTO W/O SCOPE: CPT | Performed by: UROLOGY

## 2024-11-20 PROCEDURE — 1159F MED LIST DOCD IN RCRD: CPT | Performed by: UROLOGY

## 2024-11-20 PROCEDURE — 3017F COLORECTAL CA SCREEN DOC REV: CPT | Performed by: UROLOGY

## 2024-11-20 PROCEDURE — 4004F PT TOBACCO SCREEN RCVD TLK: CPT | Performed by: UROLOGY

## 2024-11-20 PROCEDURE — 1123F ACP DISCUSS/DSCN MKR DOCD: CPT | Performed by: UROLOGY

## 2024-11-20 ASSESSMENT — ENCOUNTER SYMPTOMS
NAUSEA: 0
BACK PAIN: 0

## 2024-11-20 NOTE — PROGRESS NOTES
[facility]  [address] [place] [id number]  [phone]    Isaiah Maya  : 1955    Chief Complaint   Patient presents with    Follow-up        HPI     Isaiah Maya is a 69 y.o. adult   Hx  of elevated PSA, favorable % free PSA, 85 gm prostate on MRI.      PSA 4.9 with PSA of 5.3 on repeat sample in . PSA of 5.3 with 17% free PSA.  Positive family history of prostate cancer, father and brother.   Biopsy  showed 28 g prostate with PSA density of 0.17.  Benign pathology.     PSA 4.19 May -06.PSA and free total, 2.77, 29% free PSA,. 14% chance of positive biopsy.     PSA 2.68 in .PSA 2.12 in .  PSA 2.74 in .    PSA level of 7.3 ng/ml in .  Prior PSAs have been high (7.0 in -15, 4.5 in -16, 4.6 in , 6.9 in .).      In  total PSA 8.7 with 21.4 % free PSA.  Had MRI 18:      IMPRESSION:  1.  Possible low-grade tumor in the peripheral zone bilaterally.  Small 6 mm  discrete lesion in the mid left peripheral zone, intermediate probability of clinically significant tumor.  PI-RADS 3.  2. No evidence of significant adenopathy or metastatic disease in the lower Abdomen/pelvis. approx prostate wieght 85 gm.         4K score done in 2018 at pt's request, score is 1 which gives <1% chance of developing metastatic prostate cancer over the next 10 years. Total PSA was 5.82 with 26 % free PSA in -.      In 3-19, total PSA 9.5, 19.9% free PSA. Voiding better on Flomax. He has not wanted to have bx as recommended.  In , total PSA 7.4 with 31.5% free PSA.  PSA 6.7 in -.  In , total PSA 5.2 with 40% free PSA.   He is still having weak stream despite the Flomax.   Referred back by Dr. Nathaly Souza. Total PSA is 7.3 with 33% free PSA.     Past Medical History:   Diagnosis Date    Allergic rhinitis 3/10/2015    Diverticulosis     Essential hypertension, benign 3/10/2015    Hypertrophy of prostate without urinary obstruction and other

## 2025-05-21 ENCOUNTER — OFFICE VISIT (OUTPATIENT)
Dept: UROLOGY | Age: 70
End: 2025-05-21
Payer: MEDICARE

## 2025-05-21 DIAGNOSIS — R97.20 ELEVATED PROSTATE SPECIFIC ANTIGEN (PSA): Primary | ICD-10-CM

## 2025-05-21 DIAGNOSIS — R39.9 UNSPECIFIED SYMPTOMS AND SIGNS INVOLVING THE GENITOURINARY SYSTEM: ICD-10-CM

## 2025-05-21 DIAGNOSIS — R97.20 ELEVATED PROSTATE SPECIFIC ANTIGEN (PSA): ICD-10-CM

## 2025-05-21 LAB
BILIRUBIN, URINE, POC: NEGATIVE
BLOOD URINE, POC: NEGATIVE
GLUCOSE URINE, POC: NEGATIVE MG/DL
KETONES, URINE, POC: NEGATIVE MG/DL
LEUKOCYTE ESTERASE, URINE, POC: NEGATIVE
NITRITE, URINE, POC: NEGATIVE
PH, URINE, POC: 6 (ref 4.6–8)
PROTEIN,URINE, POC: NEGATIVE MG/DL
PSA FREE MFR SERPL: 37.9 %
PSA FREE SERPL-MCNC: 3.2 NG/ML
PSA SERPL-MCNC: 8.4 NG/ML (ref 0–4)
SPECIFIC GRAVITY, URINE, POC: 1 (ref 1–1.03)
URINALYSIS CLARITY, POC: NORMAL
URINALYSIS COLOR, POC: NORMAL
UROBILINOGEN, POC: NORMAL MG/DL

## 2025-05-21 PROCEDURE — 1123F ACP DISCUSS/DSCN MKR DOCD: CPT | Performed by: UROLOGY

## 2025-05-21 PROCEDURE — 3017F COLORECTAL CA SCREEN DOC REV: CPT | Performed by: UROLOGY

## 2025-05-21 PROCEDURE — 1159F MED LIST DOCD IN RCRD: CPT | Performed by: UROLOGY

## 2025-05-21 PROCEDURE — G8427 DOCREV CUR MEDS BY ELIG CLIN: HCPCS | Performed by: UROLOGY

## 2025-05-21 PROCEDURE — 99213 OFFICE O/P EST LOW 20 MIN: CPT | Performed by: UROLOGY

## 2025-05-21 PROCEDURE — 4004F PT TOBACCO SCREEN RCVD TLK: CPT | Performed by: UROLOGY

## 2025-05-21 PROCEDURE — 81003 URINALYSIS AUTO W/O SCOPE: CPT | Performed by: UROLOGY

## 2025-05-21 PROCEDURE — G8421 BMI NOT CALCULATED: HCPCS | Performed by: UROLOGY

## 2025-05-21 RX ORDER — TAMSULOSIN HYDROCHLORIDE 0.4 MG/1
0.4 CAPSULE ORAL 2 TIMES DAILY
Qty: 180 CAPSULE | Refills: 3 | Status: SHIPPED | OUTPATIENT
Start: 2025-05-21

## 2025-05-21 RX ORDER — TAMSULOSIN HYDROCHLORIDE 0.4 MG/1
0.4 CAPSULE ORAL 2 TIMES DAILY
Qty: 180 CAPSULE | Refills: 3 | Status: SHIPPED | OUTPATIENT
Start: 2025-05-21 | End: 2025-05-21 | Stop reason: SDUPTHER

## 2025-05-21 ASSESSMENT — ENCOUNTER SYMPTOMS
NAUSEA: 0
BACK PAIN: 0

## 2025-05-21 NOTE — PROGRESS NOTES
3/10/2015    Diverticulosis     Essential hypertension, benign 3/10/2015    Hypertrophy of prostate without urinary obstruction and other lower urinary tract symptoms (LUTS) 3/10/2015    Impotence of organic origin 3/10/2015    Insomnia     Mixed hyperlipidemia     Obesity (BMI 35.0-39.9 without comorbidity)     Sleep apnea 06/2015    CPAP-Dr. Bullock     Past Surgical History:   Procedure Laterality Date    COLONOSCOPY  10/18/2017    Dr. Foote- diverticulosis, int hemorrhoids    COLONOSCOPY  05/31/2012    Hyperplastic polyp- Dr. Foote    COLONOSCOPY  12/23/2008    colon polyps, diverticulosis and int hemorrhoids    OTHER SURGICAL HISTORY  10/1/2005    negative    UROLOGICAL SURGERY  10/2005    negative- Dr. Joshi    WISDOM TOOTH EXTRACTION       Current Outpatient Medications   Medication Sig Dispense Refill    tamsulosin (FLOMAX) 0.4 MG capsule Take 1 capsule by mouth in the morning and at bedtime TAKE 1 CAPSULE BY MOUTH EVERY  capsule 3    tadalafil (CIALIS) 20 MG tablet Take 1 tablet by mouth as needed for Erectile Dysfunction 6 tablet 12    aspirin 81 MG EC tablet Take by mouth daily      atorvastatin (LIPITOR) 10 MG tablet Take by mouth daily      cetirizine (ZYRTEC) 10 MG tablet Take 1 tablet by mouth as needed      fluticasone (FLONASE) 50 MCG/ACT nasal spray 2 sprays as needed      lisinopril (PRINIVIL;ZESTRIL) 20 MG tablet TAKE 1 TAB BY MOUTH DAILY.      metFORMIN (GLUCOPHAGE) 500 MG tablet Take 1 tablet by mouth daily (with breakfast)      dilTIAZem (TIAZAC) 240 MG extended release capsule Take 240 mg by mouth daily (Patient not taking: Reported on 5/21/2025)      LORazepam (ATIVAN) 1 MG tablet Take 1 mg by mouth. (Patient not taking: Reported on 5/21/2025)       No current facility-administered medications for this visit.     Allergies   Allergen Reactions    Penicillins Other (See Comments)     Social History     Socioeconomic History    Marital status: Single     Spouse name: Not on

## 2025-05-22 ENCOUNTER — TELEPHONE (OUTPATIENT)
Dept: UROLOGY | Age: 70
End: 2025-05-22

## 2025-05-25 ENCOUNTER — TELEPHONE (OUTPATIENT)
Dept: UROLOGY | Age: 70
End: 2025-05-25

## 2025-05-25 NOTE — TELEPHONE ENCOUNTER
Call pt with PSA results, total is 8.4 , 37.9% free PSA  This is about the same level as 5 years ago. So a good sign.   Make appt in 6 months with PSA II before.